# Patient Record
Sex: FEMALE | Race: BLACK OR AFRICAN AMERICAN | NOT HISPANIC OR LATINO | Employment: UNEMPLOYED | URBAN - METROPOLITAN AREA
[De-identification: names, ages, dates, MRNs, and addresses within clinical notes are randomized per-mention and may not be internally consistent; named-entity substitution may affect disease eponyms.]

---

## 2017-01-08 ENCOUNTER — HOSPITAL ENCOUNTER (EMERGENCY)
Facility: HOSPITAL | Age: 22
Discharge: HOME | End: 2017-01-08
Attending: EMERGENCY MEDICINE

## 2017-01-08 VITALS
DIASTOLIC BLOOD PRESSURE: 71 MMHG | RESPIRATION RATE: 16 BRPM | OXYGEN SATURATION: 100 % | HEART RATE: 73 BPM | TEMPERATURE: 97.7 F | SYSTOLIC BLOOD PRESSURE: 102 MMHG | WEIGHT: 170 LBS

## 2017-01-08 DIAGNOSIS — F15.929: ICD-10-CM

## 2017-01-08 DIAGNOSIS — R55 NEAR SYNCOPE: Primary | ICD-10-CM

## 2017-01-08 LAB
AMPHETAMINES SERPL QL SCN: NEGATIVE
ATRIAL RATE: 78 BPM
BARBITURATES UR QL: NEGATIVE
BASE EXCESS BLDA CALC-SCNC: 0 MMOL/L (ref -2–3)
BENZODIAZ UR QL: NEGATIVE
CA-I BLD-SCNC: 1.13 MMOL/L (ref 1.12–1.32)
COCAINE UR QL: NEGATIVE
GLUCOSE SERPL-MCNC: 90 MG/DL (ref 65–140)
HCG UR QL: NEGATIVE
HCO3 BLDA-SCNC: 25.7 MMOL/L (ref 24–30)
HCT VFR BLD CALC: 38 % (ref 34.8–46.1)
HGB BLDA-MCNC: 12.9 G/DL (ref 11.5–15.4)
METHADONE UR QL: NEGATIVE
OPIATES UR QL SCN: NEGATIVE
P AXIS: 32 DEGREES
PCO2 BLD: 27 MMOL/L (ref 21–32)
PCO2 BLD: 45.5 MM HG (ref 42–50)
PCP UR QL: NEGATIVE
PH BLD: 7.36 [PH] (ref 7.3–7.4)
PO2 BLD: 27 MM HG (ref 35–45)
POTASSIUM BLD-SCNC: 3.6 MMOL/L (ref 3.5–5.3)
PR INTERVAL: 168 MS
QRS AXIS: 70 DEGREES
QRSD INTERVAL: 90 MS
QT INTERVAL: 360 MS
QTC INTERVAL: 410 MS
SAO2 % BLD FROM PO2: 48 % (ref 95–98)
SODIUM BLD-SCNC: 139 MMOL/L (ref 136–145)
SPECIMEN SOURCE: ABNORMAL
T WAVE AXIS: 38 DEGREES
THC UR QL: POSITIVE
VENTRICULAR RATE: 78 BPM

## 2017-01-08 PROCEDURE — 93005 ELECTROCARDIOGRAM TRACING: CPT | Performed by: EMERGENCY MEDICINE

## 2017-01-08 PROCEDURE — 82947 ASSAY GLUCOSE BLOOD QUANT: CPT

## 2017-01-08 PROCEDURE — 84132 ASSAY OF SERUM POTASSIUM: CPT

## 2017-01-08 PROCEDURE — 85014 HEMATOCRIT: CPT

## 2017-01-08 PROCEDURE — 99284 EMERGENCY DEPT VISIT MOD MDM: CPT

## 2017-01-08 PROCEDURE — 80307 DRUG TEST PRSMV CHEM ANLYZR: CPT | Performed by: EMERGENCY MEDICINE

## 2017-01-08 PROCEDURE — 84295 ASSAY OF SERUM SODIUM: CPT

## 2017-01-08 PROCEDURE — 82330 ASSAY OF CALCIUM: CPT

## 2017-01-08 PROCEDURE — 82803 BLOOD GASES ANY COMBINATION: CPT

## 2017-01-08 PROCEDURE — 81025 URINE PREGNANCY TEST: CPT | Performed by: EMERGENCY MEDICINE

## 2017-04-04 ENCOUNTER — HOSPITAL ENCOUNTER (EMERGENCY)
Facility: HOSPITAL | Age: 22
Discharge: HOME | End: 2017-04-04
Attending: EMERGENCY MEDICINE | Admitting: EMERGENCY MEDICINE

## 2017-04-04 VITALS
TEMPERATURE: 98.3 F | WEIGHT: 180 LBS | DIASTOLIC BLOOD PRESSURE: 56 MMHG | SYSTOLIC BLOOD PRESSURE: 102 MMHG | HEART RATE: 63 BPM | HEIGHT: 63 IN | RESPIRATION RATE: 18 BRPM | BODY MASS INDEX: 31.89 KG/M2 | OXYGEN SATURATION: 100 %

## 2017-04-04 DIAGNOSIS — R10.9 ABDOMINAL CRAMPING: Primary | ICD-10-CM

## 2017-04-04 LAB
ALBUMIN SERPL BCP-MCNC: 3.6 G/DL (ref 3.5–5)
ALP SERPL-CCNC: 106 U/L (ref 46–384)
ALT SERPL W P-5'-P-CCNC: 28 U/L (ref 12–78)
ANION GAP SERPL CALCULATED.3IONS-SCNC: 7 MMOL/L (ref 4–13)
AST SERPL W P-5'-P-CCNC: 16 U/L (ref 5–45)
BACTERIA UR QL AUTO: ABNORMAL /HPF
BASOPHILS # BLD AUTO: 0.03 THOUSANDS/ΜL (ref 0–0.1)
BASOPHILS NFR BLD AUTO: 0 % (ref 0–1)
BILIRUB SERPL-MCNC: 0.16 MG/DL (ref 0.2–1)
BILIRUB UR QL STRIP: NEGATIVE
BILIRUB UR QL STRIP: NEGATIVE
BUN SERPL-MCNC: 10 MG/DL (ref 5–25)
CALCIUM SERPL-MCNC: 9.2 MG/DL (ref 8.3–10.1)
CHLORIDE SERPL-SCNC: 104 MMOL/L (ref 100–108)
CLARITY UR: CLEAR
CLARITY UR: CLEAR
CO2 SERPL-SCNC: 27 MMOL/L (ref 21–32)
COLOR UR: YELLOW
COLOR UR: YELLOW
COLOR, POC: NORMAL
CREAT SERPL-MCNC: 0.67 MG/DL (ref 0.6–1.3)
EOSINOPHIL # BLD AUTO: 0.1 THOUSAND/ΜL (ref 0–0.61)
EOSINOPHIL NFR BLD AUTO: 1 % (ref 0–6)
ERYTHROCYTE [DISTWIDTH] IN BLOOD BY AUTOMATED COUNT: 13 % (ref 11.6–15.1)
GFR SERPL CREATININE-BSD FRML MDRD: >60 ML/MIN/1.73SQ M
GLUCOSE SERPL-MCNC: 98 MG/DL (ref 65–140)
GLUCOSE UR STRIP-MCNC: NEGATIVE MG/DL
GLUCOSE UR STRIP-MCNC: NEGATIVE MG/DL
HCG UR QL: NORMAL
HCT VFR BLD AUTO: 41.8 % (ref 34.8–46.1)
HGB BLD-MCNC: 13.7 G/DL (ref 11.5–15.4)
HGB UR QL STRIP.AUTO: NEGATIVE
HGB UR QL STRIP.AUTO: NEGATIVE
HOLD SPECIMEN: NORMAL
HYALINE CASTS #/AREA URNS LPF: ABNORMAL /LPF
KETONES UR STRIP-MCNC: NEGATIVE MG/DL
KETONES UR STRIP-MCNC: NEGATIVE MG/DL
LEUKOCYTE ESTERASE UR QL STRIP: ABNORMAL
LEUKOCYTE ESTERASE UR QL STRIP: NEGATIVE
LIPASE SERPL-CCNC: 198 U/L (ref 73–393)
LYMPHOCYTES # BLD AUTO: 3.64 THOUSANDS/ΜL (ref 0.6–4.47)
LYMPHOCYTES NFR BLD AUTO: 38 % (ref 14–44)
MCH RBC QN AUTO: 26.8 PG (ref 26.8–34.3)
MCHC RBC AUTO-ENTMCNC: 32.8 G/DL (ref 31.4–37.4)
MCV RBC AUTO: 82 FL (ref 82–98)
MONOCYTES # BLD AUTO: 0.58 THOUSAND/ΜL (ref 0.17–1.22)
MONOCYTES NFR BLD AUTO: 6 % (ref 4–12)
NEUTROPHILS # BLD AUTO: 5.21 THOUSANDS/ΜL (ref 1.85–7.62)
NEUTS SEG NFR BLD AUTO: 55 % (ref 43–75)
NITRITE UR QL STRIP: NEGATIVE
NITRITE UR QL STRIP: NEGATIVE
NON-SQ EPI CELLS URNS QL MICRO: ABNORMAL /HPF
NRBC BLD AUTO-RTO: 0 /100 WBCS
PH UR STRIP.AUTO: 5 [PH] (ref 4.5–8)
PH UR STRIP.AUTO: 5.5 [PH] (ref 4.5–8)
PLATELET # BLD AUTO: 301 THOUSANDS/UL (ref 149–390)
PMV BLD AUTO: 9.6 FL (ref 8.9–12.7)
POTASSIUM SERPL-SCNC: 3.6 MMOL/L (ref 3.5–5.3)
PROT SERPL-MCNC: 8.1 G/DL (ref 6.4–8.2)
PROT UR STRIP-MCNC: NEGATIVE MG/DL
PROT UR STRIP-MCNC: NEGATIVE MG/DL
RBC # BLD AUTO: 5.12 MILLION/UL (ref 3.81–5.12)
RBC #/AREA URNS AUTO: ABNORMAL /HPF
SODIUM SERPL-SCNC: 138 MMOL/L (ref 136–145)
SP GR UR STRIP.AUTO: 1.01 (ref 1–1.03)
SP GR UR STRIP.AUTO: 1.01 (ref 1–1.03)
UROBILINOGEN UR QL STRIP.AUTO: 0.2 E.U./DL
UROBILINOGEN UR QL STRIP.AUTO: 0.2 E.U./DL
WBC # BLD AUTO: 9.59 THOUSAND/UL (ref 4.31–10.16)
WBC #/AREA URNS AUTO: ABNORMAL /HPF

## 2017-04-04 PROCEDURE — 81001 URINALYSIS AUTO W/SCOPE: CPT

## 2017-04-04 PROCEDURE — 87147 CULTURE TYPE IMMUNOLOGIC: CPT

## 2017-04-04 PROCEDURE — 87591 N.GONORRHOEAE DNA AMP PROB: CPT | Performed by: EMERGENCY MEDICINE

## 2017-04-04 PROCEDURE — 81025 URINE PREGNANCY TEST: CPT

## 2017-04-04 PROCEDURE — 80053 COMPREHEN METABOLIC PANEL: CPT

## 2017-04-04 PROCEDURE — 99284 EMERGENCY DEPT VISIT MOD MDM: CPT

## 2017-04-04 PROCEDURE — 83690 ASSAY OF LIPASE: CPT

## 2017-04-04 PROCEDURE — 36415 COLL VENOUS BLD VENIPUNCTURE: CPT

## 2017-04-04 PROCEDURE — 81003 URINALYSIS AUTO W/O SCOPE: CPT

## 2017-04-04 PROCEDURE — 81002 URINALYSIS NONAUTO W/O SCOPE: CPT | Performed by: EMERGENCY MEDICINE

## 2017-04-04 PROCEDURE — 87491 CHLMYD TRACH DNA AMP PROBE: CPT | Performed by: EMERGENCY MEDICINE

## 2017-04-04 PROCEDURE — 85025 COMPLETE CBC W/AUTO DIFF WBC: CPT

## 2017-04-04 PROCEDURE — 87086 URINE CULTURE/COLONY COUNT: CPT

## 2017-04-05 LAB
CHLAMYDIA DNA CVX QL NAA+PROBE: NORMAL
N GONORRHOEA DNA GENITAL QL NAA+PROBE: NORMAL

## 2017-04-06 LAB
BACTERIA UR CULT: NORMAL
BACTERIA UR CULT: NORMAL

## 2018-03-27 ENCOUNTER — HOSPITAL ENCOUNTER (EMERGENCY)
Facility: HOSPITAL | Age: 23
Discharge: HOME | End: 2018-03-27
Attending: EMERGENCY MEDICINE
Payer: COMMERCIAL

## 2018-03-27 VITALS
SYSTOLIC BLOOD PRESSURE: 132 MMHG | RESPIRATION RATE: 18 BRPM | OXYGEN SATURATION: 99 % | HEART RATE: 89 BPM | DIASTOLIC BLOOD PRESSURE: 72 MMHG | TEMPERATURE: 97.6 F

## 2018-03-27 DIAGNOSIS — J02.9 SORE THROAT: Primary | ICD-10-CM

## 2018-03-27 PROCEDURE — 99282 EMERGENCY DEPT VISIT SF MDM: CPT

## 2018-03-27 RX ORDER — AZITHROMYCIN 250 MG/1
250 TABLET, FILM COATED ORAL DAILY
Qty: 6 TABLET | Refills: 0 | Status: SHIPPED | OUTPATIENT
Start: 2018-03-27 | End: 2018-04-01

## 2018-03-27 RX ADMIN — DEXAMETHASONE SODIUM PHOSPHATE 10 MG: 10 INJECTION, SOLUTION INTRAMUSCULAR; INTRAVENOUS at 16:12

## 2018-03-27 NOTE — ED ATTENDING ATTESTATION
Beronica Katz MD, saw and evaluated the patient  I have discussed the patient with the resident/non-physician practitioner and agree with the resident's/non-physician practitioner's findings, Plan of Care, and MDM as documented in the resident's/non-physician practitioner's note, except where noted  All available labs and Radiology studies were reviewed  At this point I agree with the current assessment done in the Emergency Department  I have conducted an independent evaluation of this patient a history and physical is as follows:   The patient presents for evaluation of sore throat, the symptoms have been present for approximately 2 days right seems worse than the left she has no fever or chills no difficulty swallowing on exam she has a normal voice no stridor or drooling mild nasal congestion throat there is mild asymmetry right greater than left in the posterior pharyngeal area there is no exudate noted the uvula is midline tender anterior cervical node on the right  Neck supple lungs clear  Impression pharyngitis Decadron antibiotics careful return precautions I do not believe this to be a peritonsillar abscess from a clinical standpoint    Critical Care Time  CritCare Time    Procedures

## 2018-03-27 NOTE — DISCHARGE INSTRUCTIONS

## 2018-03-27 NOTE — ED PROVIDER NOTES
History  Chief Complaint   Patient presents with    Sore Throat     pt with sore throat feels like her right tonsil is swollen        History provided by:  Relative  History limited by:  Acuity of condition   used: No    Sore Throat   Associated symptoms: no adenopathy, no chest pain, no chills, no cough, no fever, no headaches, no neck stiffness, no rash and no shortness of breath      23year old female with recurrent strep throat p/w right throat pain  Onset 2 days ago  Hx similar episodes  Denies dysphonia, odynophagia  No other assoc sx  No fevers  No  Cough  No a/e factors, no assoc sx  A/P: sore throat, right tonsillomegaly with exudate  Uvula midline  Will tx with amoxil  None       Past Medical History:   Diagnosis Date    Asthma        History reviewed  No pertinent surgical history  History reviewed  No pertinent family history  I have reviewed and agree with the history as documented  Social History   Substance Use Topics    Smoking status: Current Some Day Smoker    Smokeless tobacco: Not on file    Alcohol use No        Review of Systems   Constitutional: Negative for chills, fatigue and fever  HENT: Positive for sore throat  Eyes: Negative for photophobia and visual disturbance  Respiratory: Negative for cough and shortness of breath  Cardiovascular: Negative for chest pain, palpitations and leg swelling  Gastrointestinal: Negative for diarrhea, nausea and vomiting  Endocrine: Negative for polydipsia and polyuria  Genitourinary: Negative for decreased urine volume, difficulty urinating, dysuria and frequency  Musculoskeletal: Negative for back pain, neck pain and neck stiffness  Skin: Negative for color change and rash  Allergic/Immunologic: Negative for environmental allergies and immunocompromised state  Neurological: Negative for dizziness and headaches  Hematological: Negative for adenopathy  Does not bruise/bleed easily  Psychiatric/Behavioral: Negative for dysphoric mood  The patient is not nervous/anxious  Physical Exam  ED Triage Vitals [03/27/18 1600]   Temperature Pulse Respirations Blood Pressure SpO2   97 6 °F (36 4 °C) 89 18 132/72 99 %      Temp Source Heart Rate Source Patient Position - Orthostatic VS BP Location FiO2 (%)   Oral Monitor Sitting Left arm --      Pain Score       5           Orthostatic Vital Signs  Vitals:    03/27/18 1600   BP: 132/72   Pulse: 89   Patient Position - Orthostatic VS: Sitting       Physical Exam   Constitutional: She is oriented to person, place, and time  She appears well-developed and well-nourished  No distress  HENT:   Head: Normocephalic and atraumatic  Nose: Nose normal    Eyes: Conjunctivae and EOM are normal  Pupils are equal, round, and reactive to light  No scleral icterus  Neck: Normal range of motion  Neck supple  No JVD present  No tracheal deviation present  No thyromegaly present  Cardiovascular: Normal rate, regular rhythm, normal heart sounds and intact distal pulses  Exam reveals no gallop and no friction rub  Pulmonary/Chest: Effort normal and breath sounds normal  No respiratory distress  She has no wheezes  She has no rales  She exhibits no tenderness  Abdominal: Soft  Bowel sounds are normal  She exhibits no distension and no mass  There is no tenderness  There is no rebound and no guarding  No hernia  Musculoskeletal: Normal range of motion  She exhibits no edema, tenderness or deformity  Neurological: She is alert and oriented to person, place, and time  She has normal reflexes  No cranial nerve deficit  Coordination normal    Skin: Skin is warm and dry  She is not diaphoretic  No erythema  Psychiatric: She has a normal mood and affect  Her behavior is normal    Nursing note and vitals reviewed        ED Medications  Medications   dexamethasone 10 mg/mL oral liquid 10 mg 1 mL (10 mg Oral Given 3/27/18 1612)       Diagnostic Studies  Results Reviewed     None                 No orders to display         Procedures  Procedures      Phone Consults  ED Phone Contact    ED Course  ED Course                                MDM  Number of Diagnoses or Management Options  Sore throat: new and requires workup    CritCare Time    Disposition  Final diagnoses:   Sore throat     Time reflects when diagnosis was documented in both MDM as applicable and the Disposition within this note     Time User Action Codes Description Comment    3/27/2018  4:15 PM Roby Patel Add [J02 9] Sore throat       ED Disposition     ED Disposition Condition Comment    Discharge  Roshni Garcia discharge to home/self care  Condition at discharge: Good        Follow-up Information     Follow up With Specialties Details Why [address] [facility] Emergency Department Emergency Medicine   [address] [place] [address], [address] [place] [address], [address] [place] [address] Jj Sanchez MD Otolaryngology   [address] [place] [address]  [phone]           Discharge Medication List as of 3/27/2018  4:22 PM      START taking these medications    Details   azithromycin (ZITHROMAX) 250 mg tablet Take 1 tablet (250 mg total) by mouth daily for 5 days Take first 2 tablets together, then 1 every day until finished , Starting Tue 3/27/2018, Until Sun 4/1/2018, Print           No discharge procedures on file  ED Provider  Attending physically available and evaluated Roshni Garcia  RAMONA managed the patient along with the ED Attending      Electronically Signed by         Marian Mckeon DO  03/30/18 4068

## 2018-08-27 ENCOUNTER — HOSPITAL ENCOUNTER (EMERGENCY)
Facility: HOSPITAL | Age: 23
Discharge: HOME | End: 2018-08-27
Attending: EMERGENCY MEDICINE | Admitting: EMERGENCY MEDICINE
Payer: COMMERCIAL

## 2018-08-27 VITALS
HEART RATE: 90 BPM | RESPIRATION RATE: 20 BRPM | SYSTOLIC BLOOD PRESSURE: 117 MMHG | DIASTOLIC BLOOD PRESSURE: 64 MMHG | BODY MASS INDEX: 35.96 KG/M2 | OXYGEN SATURATION: 99 % | TEMPERATURE: 98.4 F | WEIGHT: 203 LBS

## 2018-08-27 DIAGNOSIS — J02.9 VIRAL PHARYNGITIS: Primary | ICD-10-CM

## 2018-08-27 DIAGNOSIS — J06.9 VIRAL URI WITH COUGH: ICD-10-CM

## 2018-08-27 LAB — S PYO AG THROAT QL: NEGATIVE

## 2018-08-27 PROCEDURE — 99283 EMERGENCY DEPT VISIT LOW MDM: CPT

## 2018-08-27 PROCEDURE — 87430 STREP A AG IA: CPT | Performed by: PHYSICIAN ASSISTANT

## 2018-08-27 RX ORDER — IBUPROFEN 600 MG/1
600 TABLET ORAL EVERY 6 HOURS PRN
Qty: 30 TABLET | Refills: 0 | Status: SHIPPED | OUTPATIENT
Start: 2018-08-27 | End: 2022-07-08

## 2018-08-27 NOTE — ED PROVIDER NOTES
History  Chief Complaint   Patient presents with    Fever - 9 weeks to 74 years     fever yesterday, sore throat, mild ache, took tylenol yesterday  68-year-old female with past medical history of asthma, who presents to the emergency department for fever and sore throat for the past week  Patient states that she had a temperature of 101 3° F yesterday, but no fever today  She did take Tylenol yesterday with improvement in temperature  States that the 6/10 sore throat is worse with swallowing  Also complains of generalized headache, nasal congestion, productive cough, rhinorrhea  Denies drooling  Denies ear pain, neck pain  Denies chest pain or shortness of breath  Has positive ill contacts with relative who has similar symptoms  History provided by:  Patient   used: No    Fever - 9 weeks to 74 years   Associated symptoms: congestion, cough, headaches, rhinorrhea and sore throat    Associated symptoms: no chest pain, no chills, no diarrhea, no dysuria, no ear pain, no myalgias, no nausea, no rash and no vomiting        None       Past Medical History:   Diagnosis Date    Asthma        History reviewed  No pertinent surgical history  History reviewed  No pertinent family history  I have reviewed and agree with the history as documented  Social History   Substance Use Topics    Smoking status: Current Some Day Smoker    Smokeless tobacco: Never Used    Alcohol use No        Review of Systems   Constitutional: Positive for fever  Negative for chills  HENT: Positive for congestion, rhinorrhea and sore throat  Negative for drooling, ear pain, trouble swallowing and voice change  Respiratory: Positive for cough  Negative for chest tightness, shortness of breath and wheezing  Cardiovascular: Negative for chest pain, palpitations and leg swelling  Gastrointestinal: Negative for abdominal pain, constipation, diarrhea, nausea and vomiting     Genitourinary: Negative for dysuria, flank pain, frequency, hematuria and urgency  Musculoskeletal: Negative for arthralgias, back pain, gait problem, joint swelling, myalgias, neck pain and neck stiffness  Skin: Negative for color change, pallor, rash and wound  Neurological: Positive for headaches  Negative for dizziness, syncope, weakness, light-headedness and numbness  Physical Exam  Physical Exam   Constitutional: She is oriented to person, place, and time  She appears well-developed and well-nourished  No distress  HENT:   Head: Normocephalic and atraumatic  Posterior oropharynx mildly erythematous with tonsillar hypertrophy bilaterally  No overlying exudates  Uvula midline  Eyes: Conjunctivae and EOM are normal  Pupils are equal, round, and reactive to light  Neck: Normal range of motion  Neck supple  Cardiovascular: Normal rate, regular rhythm and intact distal pulses  Pulmonary/Chest: Effort normal and breath sounds normal  She has no wheezes  She has no rales  Abdominal: Soft  Bowel sounds are normal  She exhibits no distension  There is no tenderness  There is no rebound and no guarding  Musculoskeletal: Normal range of motion  She exhibits no edema or tenderness  Lymphadenopathy:     She has cervical adenopathy  Neurological: She is alert and oriented to person, place, and time  No cranial nerve deficit or sensory deficit  She exhibits normal muscle tone  Coordination normal    Skin: Skin is warm and dry  Capillary refill takes less than 2 seconds  She is not diaphoretic  Psychiatric: She has a normal mood and affect  Her behavior is normal    Nursing note and vitals reviewed        Vital Signs  ED Triage Vitals [08/27/18 1251]   Temperature Pulse Respirations Blood Pressure SpO2   98 4 °F (36 9 °C) 90 20 117/64 99 %      Temp src Heart Rate Source Patient Position - Orthostatic VS BP Location FiO2 (%)   -- -- -- -- --      Pain Score       6           Vitals:    08/27/18 1251   BP: 117/64   Pulse: 90       Visual Acuity      ED Medications  Medications - No data to display    Diagnostic Studies  Results Reviewed     Procedure Component Value Units Date/Time    Rapid Strep A Screen Only, Adults [[id number]]  (Normal) Collected:  08/27/18 1350    Lab Status:  Final result Specimen:  Throat from Throat Updated:  08/27/18 1413     Rapid Strep A Screen Negative                 No orders to display              Procedures  Procedures       Phone Contacts  ED Phone Contact    ED Course                               MDM  Number of Diagnoses or Management Options  Viral pharyngitis:   Viral URI with cough:   Diagnosis management comments: Differential Diagnosis includes but is not limited to:   Strep pharyngitis, viral pharyngitis, viral URI with cough, low suspicion for pneumonia as lungs are clear to auscultation  Strep is negative  Likely viral in nature  Patient instructed on supportive therapy  Discharge home with primary care follow-up  Amount and/or Complexity of Data Reviewed  Clinical lab tests: ordered and reviewed      CritCare Time    Disposition  Final diagnoses:   Viral URI with cough   Viral pharyngitis     Time reflects when diagnosis was documented in both MDM as applicable and the Disposition within this note     Time User Action Codes Description Comment    8/27/2018  2:29 PM Hi Gonzalez Add [J06 9,  B97 89] Viral URI with cough     8/27/2018  2:29 PM Tonia Owusu Add [J02 9] Viral pharyngitis     8/27/2018  2:29 PM Tonia Owusu Modify [J06 9,  B97 89] Viral URI with cough     8/27/2018  2:29 PM Hi Gonzalez Modify [J02 9] Viral pharyngitis       ED Disposition     ED Disposition Condition Comment    Discharge  Jef Dealandon discharge to home/self care      Condition at discharge: Good        Follow-up Information     Follow up With Specialties Details Why [facility] Family Medicine In 1 week  [address] Jerem[place] Melara [facility] [place] [address]  [phone]          Discharge Medication List as of 8/27/2018  2:30 PM      START taking these medications    Details   ibuprofen (MOTRIN) 600 mg tablet Take 1 tablet (600 mg total) by mouth every 6 (six) hours as needed for mild pain or moderate pain, Starting Mon 8/27/2018, Print      menthol-cetylpyridinium (CEPACOL) 3 MG lozenge Take 1 lozenge (3 mg total) by mouth as needed for sore throat, Starting Mon 8/27/2018, Print           No discharge procedures on file      ED Provider  Electronically Signed by           Joanie Guallpa PA-C  08/28/18 1149

## 2018-08-27 NOTE — DISCHARGE INSTRUCTIONS
Pharyngitis   WHAT YOU NEED TO KNOW:   Pharyngitis, or sore throat, is inflammation of the tissues and structures in your pharynx (throat)  Pharyngitis is most often caused by bacteria  It may also be caused by a cold or flu virus  Other causes include smoking, allergies, or acid reflux  DISCHARGE INSTRUCTIONS:   Call 911 for any of the following:   · You have trouble breathing or swallowing because your throat is swollen or sore  Return to the emergency department if:   · You are drooling because it hurts too much to swallow  · Your fever is higher than 102? F (39?C) or lasts longer than 3 days  · You are confused  · You taste blood in your throat  Contact your healthcare provider if:   · Your throat pain gets worse  · You have a painful lump in your throat that does not go away after 5 days  · Your symptoms do not improve after 5 days  · You have questions or concerns about your condition or care  Medicines:  Viral pharyngitis will go away on its own without treatment  Your sore throat should start to feel better in 3 to 5 days for both viral and bacterial infections  You may need any of the following:  · Antibiotics  treat a bacterial infection  · NSAIDs , such as ibuprofen, help decrease swelling, pain, and fever  NSAIDs can cause stomach bleeding or kidney problems in certain people  If you take blood thinner medicine, always ask your healthcare provider if NSAIDs are safe for you  Always read the medicine label and follow directions  · Acetaminophen  decreases pain and fever  It is available without a doctor's order  Ask how much to take and how often to take it  Follow directions  Acetaminophen can cause liver damage if not taken correctly  · Take your medicine as directed  Contact your healthcare provider if you think your medicine is not helping or if you have side effects  Tell him or her if you are allergic to any medicine   Keep a list of the medicines, vitamins, and herbs you take  Include the amounts, and when and why you take them  Bring the list or the pill bottles to follow-up visits  Carry your medicine list with you in case of an emergency  Manage your symptoms:   · Gargle salt water  Mix ¼ teaspoon salt in an 8 ounce glass of warm water and gargle  This may help decrease swelling in your throat  · Drink liquids as directed  You may need to drink more liquids than usual  Liquids may help soothe your throat and prevent dehydration  Ask how much liquid to drink each day and which liquids are best for you  · Use a cool-steam humidifier  to help moisten the air in your room and calm your cough  · Soothe your throat  with cough drops, ice, soft foods, or popsicles  Prevent the spread of pharyngitis:  Cover your mouth and nose when you cough or sneeze  Do not share food or drinks  Wash your hands often  Use soap and water  If soap and water are unavailable, use an alcohol based hand   Follow up with your healthcare provider as directed:  Write down your questions so you remember to ask them during your visits  © 2017 [facility] Information is for End User's use only and may not be sold, redistributed or otherwise used for commercial purposes  All illustrations and images included in CareNotes® are the copyrighted property of A D A M , Inc  or Lito Valdez  The above information is an  only  It is not intended as medical advice for individual conditions or treatments  Talk to your doctor, nurse or pharmacist before following any medical regimen to see if it is safe and effective for you  Upper Respiratory Infection   WHAT YOU NEED TO KNOW:   An upper respiratory infection is also called the common cold  It is an infection that can affect your nose, throat, ears, and sinuses  For healthy people, the common cold is usually not serious and does not need special treatment   Cold symptoms are usually worst for the first 3 to 5 days  Most people get better in 7 to 14 days  You may continue to cough for 2 to 3 weeks  Colds are caused by viruses and do not get better with antibiotics  DISCHARGE INSTRUCTIONS:   Seek care immediately if:   · You have chest pain or trouble breathing  Contact your healthcare provider if:   · You have a fever over 102ºF (39°C)  · Your sore throat gets worse or you see white or yellow spots in your throat  · Your symptoms get worse after 3 to 5 days or your cold is not better in 14 days  · You have a rash anywhere on your skin  · You have large, tender lumps in your neck  · You have thick, green, or yellow drainage from your nose  · You cough up thick yellow, green, or bloody mucus  · You are vomiting for more than 24 hours and cannot keep fluids down  · You have a bad earache  · You have questions or concerns about your condition or care  Medicines: You may need any of the following:  · Decongestants  help reduce nasal congestion and help you breathe more easily  If you take decongestant pills, they may make you feel restless or cause problems with your sleep  Do not use decongestant sprays for more than a few days  · Cough suppressants  help reduce coughing  Ask your healthcare provider which type of cough medicine is best for you  · NSAIDs , such as ibuprofen, help decrease swelling, pain, and fever  NSAIDs can cause stomach bleeding or kidney problems in certain people  If you take blood thinner medicine, always ask your healthcare provider if NSAIDs are safe for you  Always read the medicine label and follow directions  · Acetaminophen  decreases pain and fever  It is available without a doctor's order  Ask how much to take and how often to take it  Follow directions   Read the labels of all other medicines you are using to see if they also contain acetaminophen, or ask your doctor or pharmacist  Acetaminophen can cause liver damage if not taken correctly  Do not use more than 4 grams (4,000 milligrams) total of acetaminophen in one day  · Take your medicine as directed  Contact your healthcare provider if you think your medicine is not helping or if you have side effects  Tell him or her if you are allergic to any medicine  Keep a list of the medicines, vitamins, and herbs you take  Include the amounts, and when and why you take them  Bring the list or the pill bottles to follow-up visits  Carry your medicine list with you in case of an emergency  Follow up with your healthcare provider as directed:  Write down your questions so you remember to ask them during your visits  Self-care:   · Rest as much as possible  Slowly start to do more each day  · Drink more liquids as directed  Liquids will help thin and loosen mucus so you can cough it up  Liquids will also help prevent dehydration  Liquids that help prevent dehydration include water, fruit juice, and broth  Do not drink liquids that contain caffeine  Caffeine can increase your risk for dehydration  Ask your healthcare provider how much liquid to drink each day  · Soothe a sore throat  Gargle with warm salt water  This helps your sore throat feel better  Make salt water by dissolving ¼ teaspoon salt in 1 cup warm water  You may also suck on hard candy or throat lozenges  You may use a sore throat spray  · Use a humidifier or vaporizer  Use a cool mist humidifier or a vaporizer to increase air moisture in your home  This may make it easier for you to breathe and help decrease your cough  · Use saline nasal drops as directed  These help relieve congestion  · Apply petroleum-based jelly around the outside of your nostrils  This can decrease irritation from blowing your nose  · Do not smoke  Nicotine and other chemicals in cigarettes and cigars can make your symptoms worse  They can also cause infections such as bronchitis or pneumonia   Ask your healthcare provider for information if you currently smoke and need help to quit  E-cigarettes or smokeless tobacco still contain nicotine  Talk to your healthcare provider before you use these products  Prevent spreading your cold to others:   · Try to stay away from other people during the first 2 to 3 days of your cold when it is more easily spread  · Do not share food or drinks  · Do not share hand towels with household members  · Wash your hands often, especially after you blow your nose  Turn away from other people and cover your mouth and nose with a tissue when you sneeze or cough  © 2017 [facility] Information is for End User's use only and may not be sold, redistributed or otherwise used for commercial purposes  All illustrations and images included in CareNotes® are the copyrighted property of [employer]  or Lito Valdez  The above information is an  only  It is not intended as medical advice for individual conditions or treatments  Talk to your doctor, nurse or pharmacist before following any medical regimen to see if it is safe and effective for you

## 2019-07-09 ENCOUNTER — HOSPITAL ENCOUNTER (EMERGENCY)
Facility: HOSPITAL | Age: 24
Discharge: HOME | End: 2019-07-09
Attending: EMERGENCY MEDICINE
Payer: COMMERCIAL

## 2019-07-09 VITALS
WEIGHT: 190 LBS | BODY MASS INDEX: 33.66 KG/M2 | TEMPERATURE: 97.7 F | RESPIRATION RATE: 16 BRPM | DIASTOLIC BLOOD PRESSURE: 68 MMHG | HEART RATE: 75 BPM | OXYGEN SATURATION: 98 % | SYSTOLIC BLOOD PRESSURE: 104 MMHG

## 2019-07-09 DIAGNOSIS — G43.909 MIGRAINE: Primary | ICD-10-CM

## 2019-07-09 LAB
EXT PREG TEST URINE: NORMAL
EXT. CONTROL ED NAV: NORMAL

## 2019-07-09 PROCEDURE — 81025 URINE PREGNANCY TEST: CPT | Performed by: EMERGENCY MEDICINE

## 2019-07-09 PROCEDURE — 96374 THER/PROPH/DIAG INJ IV PUSH: CPT

## 2019-07-09 PROCEDURE — 96361 HYDRATE IV INFUSION ADD-ON: CPT

## 2019-07-09 PROCEDURE — 99283 EMERGENCY DEPT VISIT LOW MDM: CPT

## 2019-07-09 PROCEDURE — 96375 TX/PRO/DX INJ NEW DRUG ADDON: CPT

## 2019-07-09 PROCEDURE — 99283 EMERGENCY DEPT VISIT LOW MDM: CPT | Performed by: EMERGENCY MEDICINE

## 2019-07-09 RX ORDER — DIPHENHYDRAMINE HYDROCHLORIDE 50 MG/ML
25 INJECTION INTRAMUSCULAR; INTRAVENOUS ONCE
Status: COMPLETED | OUTPATIENT
Start: 2019-07-09 | End: 2019-07-09

## 2019-07-09 RX ORDER — KETOROLAC TROMETHAMINE 30 MG/ML
15 INJECTION, SOLUTION INTRAMUSCULAR; INTRAVENOUS ONCE
Status: COMPLETED | OUTPATIENT
Start: 2019-07-09 | End: 2019-07-09

## 2019-07-09 RX ORDER — METOCLOPRAMIDE HYDROCHLORIDE 5 MG/ML
10 INJECTION INTRAMUSCULAR; INTRAVENOUS ONCE
Status: COMPLETED | OUTPATIENT
Start: 2019-07-09 | End: 2019-07-09

## 2019-07-09 RX ADMIN — DIPHENHYDRAMINE HYDROCHLORIDE 25 MG: 50 INJECTION, SOLUTION INTRAMUSCULAR; INTRAVENOUS at 16:42

## 2019-07-09 RX ADMIN — METOCLOPRAMIDE 10 MG: 5 INJECTION, SOLUTION INTRAMUSCULAR; INTRAVENOUS at 16:41

## 2019-07-09 RX ADMIN — KETOROLAC TROMETHAMINE 15 MG: 30 INJECTION, SOLUTION INTRAMUSCULAR at 16:47

## 2019-07-09 RX ADMIN — SODIUM CHLORIDE 1000 ML: 0.9 INJECTION, SOLUTION INTRAVENOUS at 16:38

## 2019-07-09 NOTE — ED NOTES
Dr Jackie Hope at bedside     Rachid Mishra, [facility]  07/09/19 1103

## 2019-07-09 NOTE — ED PROVIDER NOTES
History  Chief Complaint   Patient presents with    Migraine     4th one this week  +headache with migraine HX in past  +light/sound sensitivity  +n/v      Patient is a 72-year-old female presents for a headache  Patient says that she has had 4 different headaches this week  She says that they have all been similar in nature  She says she has had headaches like this in the past but has not had them for a while  She says  The headache is located in the left side of her head and is constant in nature  It s associated with vomiting  She admits to minor photophobia  She denies any visual changes, neck pain, trauma to her head  She has no focal neurologic deficits on exam    She is in no acute distress in the room          Prior to Admission Medications   Prescriptions Last Dose Informant Patient Reported? Taking?   ibuprofen (MOTRIN) 600 mg tablet Not Taking at Unknown time  No No   Sig: Take 1 tablet (600 mg total) by mouth every 6 (six) hours as needed for mild pain or moderate pain   Patient not taking: Reported on 7/9/2019   menthol-cetylpyridinium (CEPACOL) 3 MG lozenge Not Taking at Unknown time  No No   Sig: Take 1 lozenge (3 mg total) by mouth as needed for sore throat   Patient not taking: Reported on 7/9/2019      Facility-Administered Medications: None       Past Medical History:   Diagnosis Date    Asthma     Migraine        History reviewed  No pertinent surgical history  History reviewed  No pertinent family history  I have reviewed and agree with the history as documented  Social History     Tobacco Use    Smoking status: Current Some Day Smoker     Types: Cigarettes    Smokeless tobacco: Never Used   Substance Use Topics    Alcohol use: No    Drug use: Yes     Types: Marijuana        Review of Systems   Constitutional: Negative for chills, diaphoresis and fever  HENT: Negative for congestion, sinus pressure, sore throat and trouble swallowing  Eyes: Positive for photophobia  Negative for pain, discharge, itching and visual disturbance  Respiratory: Negative for cough, chest tightness, shortness of breath and wheezing  Cardiovascular: Negative for chest pain, palpitations and leg swelling  Gastrointestinal: Positive for vomiting  Negative for abdominal distention, abdominal pain, blood in stool, diarrhea and nausea  Endocrine: Negative for polyphagia and polyuria  Genitourinary: Negative for difficulty urinating, dysuria, flank pain, hematuria, pelvic pain and vaginal bleeding  Musculoskeletal: Negative for arthralgias and back pain  Skin: Negative for color change and rash  Neurological: Positive for headaches  Negative for dizziness, syncope, weakness and light-headedness  Physical Exam  ED Triage Vitals [07/09/19 1544]   Temperature Pulse Respirations Blood Pressure SpO2   97 7 °F (36 5 °C) 72 18 142/82 99 %      Temp Source Heart Rate Source Patient Position - Orthostatic VS BP Location FiO2 (%)   Oral Monitor Sitting Left arm --      Pain Score       7             Orthostatic Vital Signs  Vitals:    07/09/19 1544 07/09/19 1640   BP: 142/82 104/68   Pulse: 72 75   Patient Position - Orthostatic VS: Sitting Lying       Physical Exam   Constitutional: She is oriented to person, place, and time  She appears well-developed and well-nourished  No distress  HENT:   Head: Normocephalic and atraumatic  Right Ear: External ear normal    Left Ear: External ear normal    Eyes: Pupils are equal, round, and reactive to light  Conjunctivae are normal    Neck: Normal range of motion  Neck supple  Cardiovascular: Normal rate, regular rhythm, normal heart sounds and intact distal pulses  Exam reveals no gallop and no friction rub  No murmur heard  Pulmonary/Chest: Effort normal and breath sounds normal  No respiratory distress  She has no wheezes  She has no rales  Abdominal: Soft  She exhibits no distension  There is no tenderness  There is no guarding  Musculoskeletal: Normal range of motion  She exhibits no edema, tenderness or deformity  5/5 muscle strength in all extremities   Lymphadenopathy:     She has no cervical adenopathy  Neurological: She is alert and oriented to person, place, and time  No cranial nerve deficit or sensory deficit  She exhibits normal muscle tone  Finger to nose normal, heel-to-shin normal   Skin: Skin is warm and dry  Psychiatric: She has a normal mood and affect  Nursing note and vitals reviewed  ED Medications  Medications   sodium chloride 0 9 % bolus 1,000 mL (1,000 mL Intravenous New Bag 7/9/19 1638)   ketorolac (TORADOL) injection 15 mg (15 mg Intravenous Given 7/9/19 1647)   metoclopramide (REGLAN) injection 10 mg (10 mg Intravenous Given 7/9/19 1641)   diphenhydrAMINE (BENADRYL) injection 25 mg (25 mg Intravenous Given 7/9/19 1642)       Diagnostic Studies  Results Reviewed     Procedure Component Value Units Date/Time    POCT pregnancy, urine [[id number]]  (Normal) Resulted:  07/09/19 1635    Lab Status:  Final result Updated:  07/09/19 1654     EXT PREG TEST UR (Ref: Negative) negative result     Control valid control                 No orders to display         Procedures  Procedures        ED Course                               MDM  Number of Diagnoses or Management Options  Migraine:   Diagnosis management comments:  61-year-old female presenting for migraine headache  Has had for similar headache is over the past week  Some headaches she has had the past   No focal neurologic deficits  No acute distress on exam   Vitals within normal limits  Patient will get a migraine cocktail  Will reassess  Patient feeling better after medications    Patient given follow-up information with headache specialist   Patient discharged home      Disposition  Final diagnoses:   Migraine     Time reflects when diagnosis was documented in both MDM as applicable and the Disposition within this note     Time User Action Codes Description Comment    7/9/2019  5:28 PM Nathalia Shepherd Add [J33 [id number]] Migraine       ED Disposition     ED Disposition Condition Date/Time Comment    Discharge Stable Tue Jul 9, 2019  5:28 PM Jan Barraza discharge to home/self care  Follow-up Information     Follow up With Specialties Details Why Contact Info    Remington Rea MD Neurology Schedule an appointment as soon as possible for a visit  For follow up of headaches [address]  [phone]            Patient's Medications   Discharge Prescriptions    No medications on file     No discharge procedures on file  ED Provider  Attending physically available and evaluated Jan Barraza I managed the patient along with the ED Attending      Electronically Signed by         David Thao DO  07/09/19 1017

## 2019-07-09 NOTE — ED ATTENDING ATTESTATION
Jennifer Sanchez MD, saw and evaluated the patient  I have discussed the patient with the resident/non-physician practitioner and agree with the resident's/non-physician practitioner's findings, Plan of Care, and MDM as documented in the resident's/non-physician practitioner's note, except where noted  All available labs and Radiology studies were reviewed  I was present for key portions of any procedure(s) performed by the resident/non-physician practitioner and I was immediately available to provide assistance  At this point I agree with the current assessment done in the Emergency Department  I have conducted an independent evaluation of this patient a history and physical is as follows:    28-year-old with history of migraines presents with headache  States same as typical migraines but has had a few of them this week  No recent history of trauma  No fever, chills, neck pain or stiffness  Not worst headache of life  Patient awake and alert, does not appear in any acute distress  Nonfocal neuro  Will treat symptomatically and give referral for headache clinic        Critical Care Time  Procedures

## 2020-02-03 ENCOUNTER — HOSPITAL ENCOUNTER (EMERGENCY)
Facility: HOSPITAL | Age: 25
Discharge: HOME | End: 2020-02-03
Attending: EMERGENCY MEDICINE
Payer: COMMERCIAL

## 2020-02-03 VITALS
DIASTOLIC BLOOD PRESSURE: 71 MMHG | WEIGHT: 209.44 LBS | HEART RATE: 72 BPM | SYSTOLIC BLOOD PRESSURE: 115 MMHG | RESPIRATION RATE: 18 BRPM | BODY MASS INDEX: 37.1 KG/M2 | TEMPERATURE: 97.5 F | OXYGEN SATURATION: 100 %

## 2020-02-03 DIAGNOSIS — J03.90 ACUTE TONSILLITIS: Primary | ICD-10-CM

## 2020-02-03 DIAGNOSIS — R59.0 ANTERIOR CERVICAL ADENOPATHY: ICD-10-CM

## 2020-02-03 LAB
EXT PREG TEST URINE: NEGATIVE
EXT. CONTROL ED NAV: NORMAL

## 2020-02-03 PROCEDURE — 99283 EMERGENCY DEPT VISIT LOW MDM: CPT

## 2020-02-03 PROCEDURE — 99283 EMERGENCY DEPT VISIT LOW MDM: CPT | Performed by: PHYSICIAN ASSISTANT

## 2020-02-03 PROCEDURE — 81025 URINE PREGNANCY TEST: CPT | Performed by: PHYSICIAN ASSISTANT

## 2020-02-03 RX ORDER — AMOXICILLIN 500 MG/1
500 CAPSULE ORAL 3 TIMES DAILY
Qty: 21 CAPSULE | Refills: 0 | Status: SHIPPED | OUTPATIENT
Start: 2020-02-03 | End: 2020-02-10

## 2020-02-03 NOTE — ED PROVIDER NOTES
History  Chief Complaint   Patient presents with    Sore Throat     Pt reports sore throat x 1 week  Pt reports pain when she swallows  Pt denies fevers      Patient presents to emergency department with a sore throat for a week  Patient was concerned with persistent symptoms and pain for evaluation  She is not having any fevers or chills no vomiting or diarrhea  She denies any cough  Patient admits her last menstrual cycle was the end of December and is unclear if she could be pregnant  She denies any abdominal pain or nausea or vomiting or leakage of fluids or cramping or vaginal bleeding  - hx irregular cycles  Prior to Admission Medications   Prescriptions Last Dose Informant Patient Reported? Taking?   ibuprofen (MOTRIN) 600 mg tablet   No No   Sig: Take 1 tablet (600 mg total) by mouth every 6 (six) hours as needed for mild pain or moderate pain   Patient not taking: Reported on 7/9/2019   menthol-cetylpyridinium (CEPACOL) 3 MG lozenge   No No   Sig: Take 1 lozenge (3 mg total) by mouth as needed for sore throat   Patient not taking: Reported on 7/9/2019      Facility-Administered Medications: None       Past Medical History:   Diagnosis Date    Asthma     Migraine        No past surgical history on file  No family history on file  I have reviewed and agree with the history as documented  Social History     Tobacco Use    Smoking status: Current Some Day Smoker     Types: Cigarettes    Smokeless tobacco: Never Used   Substance Use Topics    Alcohol use: No    Drug use: Yes     Types: Marijuana        Review of Systems   All other systems reviewed and are negative  Physical Exam  Physical Exam   Constitutional: She appears well-developed and well-nourished  HENT:   Head: Normocephalic and atraumatic     Right Ear: Tympanic membrane and external ear normal    Left Ear: Tympanic membrane and external ear normal    Erythema to pharynx no exudates   Eyes: Conjunctivae and EOM are normal    Neck: Neck supple  Anterior cervical adenopathy tender but soft and mobile   Cardiovascular: Normal rate, regular rhythm, normal heart sounds and intact distal pulses  Pulmonary/Chest: Effort normal and breath sounds normal    Abdominal: Soft  Bowel sounds are normal    Musculoskeletal: Normal range of motion  Lymphadenopathy:     She has cervical adenopathy  Neurological: She is alert  Skin: Skin is warm  No rash noted  Psychiatric: She has a normal mood and affect  Her behavior is normal    Nursing note and vitals reviewed  Vital Signs  ED Triage Vitals [02/03/20 1750]   Temperature Pulse Respirations Blood Pressure SpO2   97 5 °F (36 4 °C) 72 18 115/71 100 %      Temp src Heart Rate Source Patient Position - Orthostatic VS BP Location FiO2 (%)   -- -- -- -- --      Pain Score       --           Vitals:    02/03/20 1750   BP: 115/71   Pulse: 72         Visual Acuity      ED Medications  Medications - No data to display    Diagnostic Studies  Results Reviewed     Procedure Component Value Units Date/Time    POCT pregnancy, urine [[id number]]  (Normal) Resulted:  02/03/20 1921    Lab Status:  Final result Updated:  02/03/20 1921     EXT PREG TEST UR (Ref: Negative) negative     Control valid                 No orders to display              Procedures  Procedures         ED Course                               MDM  Number of Diagnoses or Management Options  Acute tonsillitis: new and does not require workup  Anterior cervical adenopathy: new and does not require workup  Diagnosis management comments: Patient's last menstrual cycle was the end of December will do pregnancy test   Patient's urine pregnancy test was negative discussed importance of follow-up for menstrual irregularity  Will start patient amoxicillin instructions reviewed      Patient Progress  Patient progress: stable        Disposition  Final diagnoses:   Acute tonsillitis   Anterior cervical adenopathy     Time reflects when diagnosis was documented in both MDM as applicable and the Disposition within this note     Time User Action Codes Description Comment    2/3/2020  6:59 PM Andressa Hunter [[id number]] Acute tonsillitis     2/3/2020  6:59 PM Andressa Hunter [R59 [id number]] Anterior cervical adenopathy       ED Disposition     ED Disposition Condition Date/Time Comment    Discharge Stable Mon Feb 3, 2020  6:59 PM Soheila Barrera discharge to home/self care  Follow-up Information     Follow up With Specialties Details Why Contact Info Additional [address] Internal Medicine   [address] [facility] [address]  [address] [address]  [address] [facility]   [address] [place] [address], [address] [address]  [place] [address], [address] [place], [place], [address] [phone]          Discharge Medication List as of 2/3/2020  7:21 PM      START taking these medications    Details   amoxicillin (AMOXIL) 500 mg capsule Take 1 capsule (500 mg total) by mouth 3 (three) times a day for 7 days, Starting Mon 2/3/2020, Until Mon 2/10/2020, Print         CONTINUE these medications which have NOT CHANGED    Details   ibuprofen (MOTRIN) 600 mg tablet Take 1 tablet (600 mg total) by mouth every 6 (six) hours as needed for mild pain or moderate pain, Starting Mon 8/27/2018, Print      menthol-cetylpyridinium (CEPACOL) 3 MG lozenge Take 1 lozenge (3 mg total) by mouth as needed for sore throat, Starting Mon 8/27/2018, Print           No discharge procedures on file      ED Provider  Electronically Signed by           Yvonne Evans PA-C  02/03/20 2032

## 2020-02-04 NOTE — DISCHARGE INSTRUCTIONS
Tylenol for fevers/pain  Saline spray for congestion you may use Mucinex for cough and congestion increase, fluids follow-up with the family doctor  Return to the emergency department for worsening symptoms  Amoxil as directed

## 2020-11-16 ENCOUNTER — HOSPITAL ENCOUNTER (EMERGENCY)
Facility: HOSPITAL | Age: 25
Discharge: HOME | End: 2020-11-16
Attending: EMERGENCY MEDICINE | Admitting: EMERGENCY MEDICINE
Payer: COMMERCIAL

## 2020-11-16 VITALS
SYSTOLIC BLOOD PRESSURE: 130 MMHG | HEART RATE: 87 BPM | OXYGEN SATURATION: 100 % | WEIGHT: 216.56 LBS | BODY MASS INDEX: 38.36 KG/M2 | RESPIRATION RATE: 16 BRPM | TEMPERATURE: 97.7 F | DIASTOLIC BLOOD PRESSURE: 74 MMHG

## 2020-11-16 DIAGNOSIS — N39.0 UTI (URINARY TRACT INFECTION): Primary | ICD-10-CM

## 2020-11-16 LAB
BACTERIA UR QL AUTO: ABNORMAL /HPF
BILIRUB UR QL STRIP: NEGATIVE
CLARITY UR: ABNORMAL
COLOR UR: ABNORMAL
EXT PREG TEST URINE: NEGATIVE
EXT. CONTROL ED NAV: NORMAL
GLUCOSE UR STRIP-MCNC: NEGATIVE MG/DL
HGB UR QL STRIP.AUTO: 150
KETONES UR STRIP-MCNC: ABNORMAL MG/DL
LEUKOCYTE ESTERASE UR QL STRIP: 500
MUCOUS THREADS UR QL AUTO: ABNORMAL
NITRITE UR QL STRIP: NEGATIVE
NON-SQ EPI CELLS URNS QL MICRO: ABNORMAL /HPF
PH UR STRIP.AUTO: 6 [PH]
PROT UR STRIP-MCNC: ABNORMAL MG/DL
RBC #/AREA URNS AUTO: ABNORMAL /HPF
SP GR UR STRIP.AUTO: 1.02 (ref 1–1.04)
UROBILINOGEN UA: 1 MG/DL
WBC #/AREA URNS AUTO: ABNORMAL /HPF

## 2020-11-16 PROCEDURE — 81003 URINALYSIS AUTO W/O SCOPE: CPT | Performed by: EMERGENCY MEDICINE

## 2020-11-16 PROCEDURE — 81001 URINALYSIS AUTO W/SCOPE: CPT | Performed by: EMERGENCY MEDICINE

## 2020-11-16 PROCEDURE — 87086 URINE CULTURE/COLONY COUNT: CPT | Performed by: EMERGENCY MEDICINE

## 2020-11-16 PROCEDURE — 99283 EMERGENCY DEPT VISIT LOW MDM: CPT

## 2020-11-16 PROCEDURE — 99284 EMERGENCY DEPT VISIT MOD MDM: CPT | Performed by: EMERGENCY MEDICINE

## 2020-11-16 PROCEDURE — 81025 URINE PREGNANCY TEST: CPT | Performed by: EMERGENCY MEDICINE

## 2020-11-16 PROCEDURE — 87077 CULTURE AEROBIC IDENTIFY: CPT | Performed by: EMERGENCY MEDICINE

## 2020-11-16 RX ORDER — CEPHALEXIN 500 MG/1
500 CAPSULE ORAL ONCE
Status: COMPLETED | OUTPATIENT
Start: 2020-11-16 | End: 2020-11-16

## 2020-11-16 RX ORDER — CEPHALEXIN 250 MG/1
500 CAPSULE ORAL 4 TIMES DAILY
Qty: 56 CAPSULE | Refills: 0 | Status: SHIPPED | OUTPATIENT
Start: 2020-11-16 | End: 2020-11-23

## 2020-11-16 RX ADMIN — CEPHALEXIN 500 MG: 500 CAPSULE ORAL at 18:27

## 2020-11-18 LAB — BACTERIA UR CULT: ABNORMAL

## 2022-02-07 LAB
EXTERNAL ABO GROUPING: NORMAL
EXTERNAL ABO GROUPING: NORMAL
EXTERNAL ANTIBODY SCREEN: NORMAL
EXTERNAL HIV SCREEN: NORMAL
EXTERNAL RH FACTOR: POSITIVE
EXTERNAL RH FACTOR: POSITIVE
HCV AB SER-ACNC: NEGATIVE

## 2022-03-02 ENCOUNTER — OFFICE VISIT (OUTPATIENT)
Dept: FAMILY MEDICINE CLINIC | Facility: CLINIC | Age: 27
End: 2022-03-02

## 2022-03-02 DIAGNOSIS — Z33.1 INCIDENTAL PREGNANCY: ICD-10-CM

## 2022-03-02 DIAGNOSIS — Z00.00 ANNUAL PHYSICAL EXAM: Primary | ICD-10-CM

## 2022-03-02 DIAGNOSIS — E66.01 CLASS 3 SEVERE OBESITY DUE TO EXCESS CALORIES WITHOUT SERIOUS COMORBIDITY WITH BODY MASS INDEX (BMI) OF 40.0 TO 44.9 IN ADULT (HCC): ICD-10-CM

## 2022-03-02 DIAGNOSIS — F32.A DEPRESSION, UNSPECIFIED DEPRESSION TYPE: ICD-10-CM

## 2022-03-02 DIAGNOSIS — Z13.31 DEPRESSION SCREEN: ICD-10-CM

## 2022-03-02 PROCEDURE — 99385 PREV VISIT NEW AGE 18-39: CPT | Performed by: PHYSICIAN ASSISTANT

## 2022-03-02 NOTE — LETTER
March 2, 2022     Patient: No Santana   YOB: 1995   Date of Visit: 3/2/2022       To Whom it May Concern:    Raj Naranjo is under my professional care  She was seen in my office on 3/2/2022  Due to medical reasons, patient cannot work for more than 5 hours a day  If you have any questions or concerns, please don't hesitate to call           Sincerely,          Kianna Child PA-C        CC: No Recipients

## 2022-03-02 NOTE — PATIENT INSTRUCTIONS
COVID-19 and Pregnancy   AMBULATORY CARE:   What you need to know about coronavirus disease 2019 (COVID-19) and pregnancy:  Pregnancy increases your risk for severe COVID-19 illness  COVID-19 can also lead to  delivery of your baby  Most babies who become infected with the new virus do not develop serious effects, but some do  It is important for you and your baby to stay safe during pregnancy and delivery  Signs and symptoms of COVID-19 in newborns: The following signs and symptoms may be from COVID-19, but they are also common in newborns  Your 's healthcare provider may recommend testing to confirm or rule out COVID-19  Your  may need a second test if the first is negative  · Fever    · Not moving arms or legs much, or being too sleepy to feed    · A runny nose or cough    · Fast breathing, or trouble breathing    · Vomiting, diarrhea, or not feeding well    Call your local emergency number (911 in the [address] [place] [address] Rd,[address]) if:   · You have trouble breathing or shortness of breath at rest     · You have chest pain or pressure that lasts longer than 5 minutes  · You become confused or hard to wake  · Your lips or face are blue  Seek care immediately if:   · You have a fever of 104°F (40°C) or higher  Call your doctor if:   · You have symptoms of COVID-19  · You have questions or concerns about your condition or care  How the 2019 coronavirus spreads: The virus spreads quickly and easily  The virus can be passed starting 2 to 3 days before symptoms begin or before a positive test if symptoms never begin  · Droplets are the main way all coronaviruses spread  The virus travels in droplets that form when a person talks, sings, coughs, or sneezes  The droplets can also float in the air for minutes or hours  Infection happens when you breathe in the droplets or get them in your eyes or nose  Close personal contact with an infected person increases your risk for infection   This means being within 6 feet (2 meters) of the person for at least 15 minutes over 24 hours  · Person-to-person contact can spread the virus  For example, a person with the virus on his or her hands can spread it by shaking hands with someone  · The virus can stay on objects and surfaces for up to 3 days  You may become infected by touching the object or surface and then touching your eyes or mouth  Protect yourself and your baby while you are pregnant: If you have COVID-19 during your pregnancy, healthcare providers will monitor you and your baby closely  Work with your healthcare provider or obstetrician  If you do not have either, experts recommend you contact a local [facility] or health department  The following measures can help keep you and your baby safe  Continue even after you are vaccinated against COVID-19  These are still the best ways to prevent infection:  · Wash your hands throughout the day  Use soap and water  Rub your soapy hands together, lacing your fingers  Wash the front and back of each hand, and in between your fingers  Use the fingers of one hand to scrub under the fingernails of the other hand  Wash for at least 20 seconds  Rinse with warm, running water for several seconds  Dry your hands with a clean towel or paper towel  Use hand  that contains alcohol if soap and water are not available  · Protect yourself from sneezes and coughs  Turn your face away and cover your mouth and nose if you are around someone who is sneezing or coughing  This helps protect you from the person's droplets  Cover your mouth and nose with a tissue when you need to sneeze or cough  Use the bend of your arm if you do not have a tissue  Throw the tissue away  Then wash your hands or use hand   · Try not to touch your face  If you get the virus on your hands, you can transfer it to your eyes, nose, or mouth and become infected   You can also transfer it to objects, surfaces, or people  · Follow worldwide, national, and local social distancing guidelines  Keep at least 6 feet (2 meters) between you and others  · Wear a face covering (mask) when needed  Use a disposable non-medical mask, or make a cloth covering with at least 2 layers  You can also create layers by putting a cloth covering over a disposable non-medical mask  Cover your mouth and your nose  Continue social distancing and washing your hands often  Do not put a face shield or covering on your   These increase the risk for sudden infant death syndrome (SIDS)  · Clean and disinfect high-touch surfaces and objects often  Use disinfecting wipes, or make a solution of 4 teaspoons of bleach in 1 quart (4 cups) of water  · Stay home if you are sick or think you may have COVID-19  It is important to stay home if you are waiting for a testing appointment or for test results  · Avoid or limit close physical contact with anyone who does not live in your home  Do not shake hands with, hug, or kiss a person as a greeting  If you must use public transportation (such as a bus or subway), try to sit or stand away from others  Wear your face covering  · Avoid in-person gatherings and crowds  Attend virtually if possible  What you can do to have a healthy pregnancy:   · Keep all prenatal and  appointments  You may be able to have certain prenatal appointments without having to go into the provider's office  Some providers offer phone, video, or other types of appointments  You may also be able to get prescriptions for a few months at a time  This will help lower the number of trips you need to make to the pharmacy for refills  If you do need to go into your provider's office, take precautions  Put a face covering on before you go into the office  Do not stand or sit within 6 feet (2 meters) of anyone in the waiting room, if possible   Do not stand or sit near anyone who is not wearing a face covering  · Get recommended vaccines  Your healthcare provider can tell you if you need vaccines not listed below, and when to get them  ? COVID-19 vaccines are given as a shot in 1 or 2 doses  Vaccination is recommended for everyone 5 years or older  One 2-dose vaccine is fully approved for those 12 or older  This vaccine also has an emergency use authorization (EUA) for children 11to 13years old  No vaccine is currently available for children younger than 5 years  A booster (additional) dose is given to help the immune system continue to protect against severe COVID-19  § A booster is recommended for all adults 18 or older  The booster can be a different brand of the COVID-19 vaccine than you originally received  The timing for the booster depends on the type of vaccine you received:    § 1-dose vaccine: The booster is given at least 2 months after you received the vaccine  § 2-dose vaccine: The booster is given at least 6 months after the second dose   § A booster can be given to adolescents 12to 16years old  Only 1 COVID-19 vaccine has an EUA for adolescent boosters  The booster is given at least 6 months after the second dose of the original vaccine series  · Get the influenza (flu) vaccine  Try to get the vaccine as soon as recommended each year, usually starting in September or October  · Get the Tdap vaccine  The Tdap vaccine protects you from tetanus, diphtheria, and pertussis  If possible, get the vaccine during weeks 27 to 36 of your pregnancy  You should get a dose of Tdap with each pregnancy  Take prenatal vitamins as directed  Your prenatal vitamins should contain folic acid  You need about 600 micrograms (mcg) of folic acid each day during pregnancy  Folic acid helps to form your baby's brain and spinal cord in early pregnancy  Eat a variety of healthy foods  Healthy foods are important, even if you take a prenatal vitamin   Healthy foods contain nutrients that help keep your immune system strong  Examples of healthy foods include vegetables, fruits, whole-grain breads and cereals, lean meats and poultry, fish, low-fat dairy products, and cooked beans  Do not have raw, undercooked, or unpasteurized food or drinks  Unpasteurized foods are foods that have not gone through the heating process (pasteurization) that destroys bacteria  Your healthcare provider or a dietitian can help you create healthy meal plans  Talk to your healthcare provider about exercise  Moderate exercise can help keep your immune system strong  Your healthcare provider can help you plan an exercise program that is safe for you during pregnancy  You may need to exercise at home if you cannot exercise outdoors, such as walking in a park  If you want to do pregnancy yoga or other group activities, be safe  Stay at least 6 feet (2 meters) away from others in the class, and the instructor  Wash your hands before you leave the building  Follow the facility's instructions for preventing infections  Try to lower your stress  You may be feeling more stressed than usual because of the COVID-19 outbreak  You may also feel stress from not being able to share your pregnancy with others  For example, you may not be able to have someone with you during prenatal visits or ultrasounds  Talk to your healthcare providers about ways to manage stress during this time  Pick 1 or 2 times a day to watch the news  Constant news watching about COVID-19 can increase your stress levels  Set a sleep schedule to go to bed and wake up at the same times each day  Do not smoke cigarettes, drink alcohol, or use drugs  Nicotine and other chemicals in cigarettes and cigars can harm your baby and your health  Alcohol can increase your risk for a miscarriage  Your baby may also be born too small or have other health problems  Certain drugs can be passed to your baby before he or she is born   Some can be passed through breast milk  It is best to quit cigarettes, alcohol, and drugs before you become pregnant and not start again after your baby is born  Ask your healthcare provider for information if you currently use any of these and need help to quit  Protect your  during delivery and while you are in the hospital:  It is not known for sure if an unborn baby can be infected with the virus that causes COVID-19  Some newborns have tested positive for the virus  The newborns may have been infected before, during, or after birth  The greatest risk is for a  to be in close contact with an infected person  Your baby may be tested for the virus soon after being born if you have COVID-23  He or she may be tested again before you leave the hospital  This depends on whether your baby has any signs or symptoms of COVID-19  You will be able to make choices for you and your baby during your hospital stay  Talk to healthcare providers about the following:  · Ask about temporary separation if you have COVID-19  Temporary separation means your  is moved to a different room from you  You will be able to make the decision if you want to do this  Separation will help lower your 's risk for being infected  You will still be able to give your  breast milk  You may need to pump the milk  Someone who does not have COVID-19 will then feed the pumped milk to your   You may instead choose to have your baby brought to you when you want to breastfeed  Wash your hands and the skin around your nipples before you hold your baby  Wear a face covering while you breastfeed  · Be careful if you have COVID-19 and do not choose temporary separation  Healthcare providers will keep your  at least 6 feet (2 meters) away from you as much as possible  Your  may be placed in an incubator  The incubator will help protect your  from infection   Always wash your hands and put on a face covering when you hold, touch, or have close contact with your   · Ask about visitors  The facility may not be allowing any visitors to newborns during this time  If you are allowed visitors, you may need to limit how many you can have at a time  Do not allow anyone who has known or suspected COVID-19 to visit  Even without signs or symptoms, the person can infect your  or others in the room  All visitors need to wash their hands and put on clean face coverings before entering your room  The face covering needs to stay on during the whole visit  Do not let anyone take the face covering down to make faces at your baby, talk, sneeze, or cough  Do not let anyone kiss you or your baby  Protect your  at home:   · You can choose to continue temporary separation if you have COVID-19  You can do this if an adult who does not have COVID-19 can care for your   Your healthcare provider can give you instructions on how to do this safely at home  Only have close contact with your  when needed  Remember to wash your hands and put on a clean face covering first  You may need to continue pumping your breast milk  A healthy adult can feed the pumped breast milk to your   You may instead choose to have your baby brought to you when you want to breastfeed  Take precautions to keep your baby safe  Wash your hands and the skin around your nipples before you hold your baby  You will also need to wear a face covering while you breastfeed  · Use face coverings safely  Everyone who has COVID-19 needs to wear a clean face covering while being within 6 feet (2 meters) of your   This includes other children in your home who are 2 years or older  Do not put a face covering or plastic face shield on your   Any covering increases your 's risk for sudden infant death syndrome (SIDS)   Do not use coverings on children younger than 2 years or on anyone who has breathing problems or cannot remove it  · Be careful about visitors  Continue precautions you used in the hospital  Do not allow anyone who has known or suspected COVID-19 to come over to see your   Have visitors put on clean face coverings before they enter your home  Have them wash their hands as soon as they come in  The face covering needs to stay on during the whole visit  · Keep all checkup appointments  You may be able to have some appointments by phone or video meeting  Other appointments will need to be in person, such as for vaccines  Vaccines are normally given to babies at certain ages  Until COVID-19 is under control, your 's provider will give you a vaccine schedule  It is important for your  to get all recommended vaccines  What you need to know about breastfeeding:  Breastfeeding for the first 6 months decreases your baby's risk for respiratory (lung) infections, allergies, asthma, and stomach problems  Breast milk also helps your baby develop a strong immune system  Breast milk is considered safe, even if you have COVID-19  Experts currently believe the virus that causes COVID-19 does not spread in breast milk  Do the following to help protect your baby:  · Wash your hands before every breastfeeding or pumping session  Even if you do not have COVID-19, you can transfer the virus from your hands to your baby or the pump  Use soap and water to wash your hands whenever possible  Use hand  that contains alcohol if soap and water are not available  · Clean and sanitize your breast pump after each use  Follow the 's directions for cleaning and sanitizing the pump  It is important not to use it until it is clean and sanitized  · If you have COVID-19:      ? Wear a face covering while you breastfeed or pump  This will help prevent you from passing the virus through droplets when you talk, cough, sneeze, or sing   The virus can stay on surfaces such as a breast pump for hours to days  ? Have someone who is not infected bottle feed your baby, if possible  Have the person wash his or her hands with soap and water before each feeding  The person can feed your  pumped breast milk or formula  Follow up with your doctor or obstetrician as directed:  Write down your questions so you remember to ask them during your visits  For more information:   · Centers for Disease Control and Prevention  [address] , [address]  Phone: 2- 968 - 334-5726  Web Address: [employer] com br    © Copyright [employer]  Information is for End User's use only and may not be sold, redistributed or otherwise used for commercial purposes  All illustrations and images included in CareNotes® are the copyrighted property of [employer]  or [employer]   The above information is an  only  It is not intended as medical advice for individual conditions or treatments  Talk to your doctor, nurse or pharmacist before following any medical regimen to see if it is safe and effective for you  Preeclampsia During Pregnancy   WHAT YOU NEED TO KNOW:   Preeclampsia is high blood pressure (BP) that usually develops after week 20 of pregnancy  It can also develop days or weeks after delivery  Your blood pressure may be 140/90 or higher  One or both numbers may be high  You may also have protein in your urine or damage to organs such as your kidneys or liver  Chronic hypertension with superimposed preeclampsia is preeclampsia in a woman with a history of hypertension before pregnancy  It can also be preeclampsia that develops before week 20 of pregnancy  Preeclampsia can lead to life-threatening conditions such as a stroke, eclampsia (seizures), or HELLP syndrome (blood cell destruction)  It is important to get screened for high BP during pregnancy  High BP does not always cause symptoms   Symptoms that do develop may be general, such as headaches and swelling that you may think are not serious  DISCHARGE INSTRUCTIONS:   Call your local emergency number (911 in the [address]) if:   · You have a seizure  · You have chest pain  Return to the emergency department if:   · You have severe abdominal pain with or without nausea and vomiting  · You develop a severe headache that does not go away with medicine  · You have blurred or spotted vision that does not go away  · You are bleeding from your vagina  Call your doctor or obstetrician if:   · You have new or increased swelling in your face or hands  · You are urinating little or not at all  · You do not feel your baby's movements as often as usual     · You have questions or concerns about your condition or care  Medicines: You may need any of the following:  · Blood pressure medicine  helps lower your blood pressure and protects your heart, lungs, brain, and kidneys  Take your blood pressure medicine exactly as directed  · Steroid medicine  helps your baby's lungs develop  These may be given if you have to deliver before 37 weeks of pregnancy  · Low doses of aspirin  may be recommended after 12 weeks of pregnancy if you are at high risk for preeclampsia  Aspirin may help prevent preeclampsia or problems that can happen from preeclampsia  Do not take aspirin unless directed by your healthcare provider  · Take your medicine as directed  Contact your healthcare provider if you think your medicine is not helping or if you have side effects  Tell him of her if you are allergic to any medicine  Keep a list of the medicines, vitamins, and herbs you take  Include the amounts, and when and why you take them  Bring the list or the pill bottles to follow-up visits  Carry your medicine list with you in case of an emergency  Manage preeclampsia:  Your BP will need to be checked by healthcare providers 1 to 2 times each week until your baby is born   The following are ways you can help manage high BP during pregnancy:  · Rest as directed  Your healthcare provider may tell you to rest more often if you have mild symptoms of preeclampsia  You may need to be in the hospital if your condition worsens  · Do not drink alcohol or smoke  Alcohol, nicotine, and other chemicals in cigarettes and cigars, can increase your BP  They can also harm your baby  Ask your healthcare provider for information if you currently drink alcohol or smoke and need help to quit  E-cigarettes or smokeless tobacco still contain nicotine  Talk to your healthcare provider before you use these products  · Do kick counts as directed  You may need to keep track of how often your baby moves or kicks over a certain amount of time  Ask your obstetrician how to do kick counts and how often to do them  · Check your weight each day  Weigh yourself every day before breakfast  Weight gain can be a sign of extra fluid in your body  Call your obstetrician if you have gained 2 or more pounds in a week  Follow up with your obstetrician as directed: You will need tests 1 to 2 times a week to check your condition  Tests include blood pressure checks, urine and blood tests, and fetal monitoring  Write down your questions so you remember to ask them during your visits  © [employer] 2022 Information is for End User's use only and may not be sold, redistributed or otherwise used for commercial purposes  All illustrations and images included in CareNotes® are the copyrighted property of [employer]  or [employer] Barnhart   The above information is an  only  It is not intended as medical advice for individual conditions or treatments  Talk to your doctor, nurse or pharmacist before following any medical regimen to see if it is safe and effective for you  Wellness Visit for Adults   AMBULATORY CARE:   A wellness visit  is when you see your healthcare provider to get screened for health problems   Your healthcare provider will also give you advice on how to stay healthy  Write down your questions so you remember to ask them  Ask your healthcare provider how often you should have a wellness visit  What happens at a wellness visit:  Your healthcare provider will ask about your health, and your family history of health problems  This includes high blood pressure, heart disease, and cancer  He or she will ask if you have symptoms that concern you, if you smoke, and about your mood  You may also be asked about your intake of medicines, supplements, food, and alcohol  Any of the following may be done:  · Your weight  will be checked  Your height may also be checked so your body mass index (BMI) can be calculated  Your BMI shows if you are at a healthy weight  · Your blood pressure  and heart rate will be checked  Your temperature may also be checked  · Blood and urine tests  may be done  Blood tests may be done to check your cholesterol levels  Abnormal cholesterol levels increase your risk for heart disease and stroke  You may also need a blood or urine test to check for diabetes if you are at increased risk  Urine tests may be done to look for signs of an infection or kidney disease  · A physical exam  includes checking your heartbeat and lungs with a stethoscope  Your healthcare provider may also check your skin to look for sun damage  · Screening tests  may be recommended  A screening test is done to check for diseases that may not cause symptoms  The screening tests you may need depend on your age, gender, family history, and lifestyle habits  For example, colorectal screening may be recommended if you are 48years old or older  Screening tests you need if you are a woman:   · A Pap smear  is used to screen for cervical cancer  Pap smears are usually done every 3 to 5 years depending on your age   You may need them more often if you have had abnormal Pap smear test results in the past  Ask your healthcare provider how often you should have a Pap smear  · A mammogram  is an x-ray of your breasts to screen for breast cancer  Experts recommend mammograms every 2 years starting at age 48 years  You may need a mammogram at age 52 years or younger if you have an increased risk for breast cancer  Talk to your healthcare provider about when you should start having mammograms and how often you need them  Vaccines you may need:   · Get an influenza vaccine  every year  The influenza vaccine protects you from the flu  Several types of viruses cause the flu  The viruses change over time, so new vaccines are made each year  · Get a tetanus-diphtheria (Td) booster vaccine  every 10 years  This vaccine protects you against tetanus and diphtheria  Tetanus is a severe infection that may cause painful muscle spasms and lockjaw  Diphtheria is a severe bacterial infection that causes a thick covering in the back of your mouth and throat  · Get a human papillomavirus (HPV) vaccine  if you are female and aged 23 to 32 or male 23 to 24 and never received it  This vaccine protects you from HPV infection  HPV is the most common infection spread by sexual contact  HPV may also cause vaginal, penile, and anal cancers  · Get a pneumococcal vaccine  if you are aged 72 years or older  The pneumococcal vaccine is an injection given to protect you from pneumococcal disease  Pneumococcal disease is an infection caused by pneumococcal bacteria  The infection may cause pneumonia, meningitis, or an ear infection  · Get a shingles vaccine  if you are 60 or older, even if you have had shingles before  The shingles vaccine is an injection to protect you from the varicella-zoster virus  This is the same virus that causes chickenpox  Shingles is a painful rash that develops in people who had chickenpox or have been exposed to the virus  How to eat healthy:  My Plate is a model for planning healthy meals   It shows the types and amounts of foods that should go on your plate  Fruits and vegetables make up about half of your plate, and grains and protein make up the other half  A serving of dairy is included on the side of your plate  The amount of calories and serving sizes you need depends on your age, gender, weight, and height  Examples of healthy foods are listed below:  · Eat a variety of vegetables  such as dark green, red, and orange vegetables  You can also include canned vegetables low in sodium (salt) and frozen vegetables without added butter or sauces  · Eat a variety of fresh fruits , canned fruit in 100% juice, frozen fruit, and dried fruit  · Include whole grains  At least half of the grains you eat should be whole grains  Examples include whole-wheat bread, wheat pasta, brown rice, and whole-grain cereals such as oatmeal     · Eat a variety of protein foods such as seafood (fish and shellfish), lean meat, and poultry without skin (turkey and chicken)  Examples of lean meats include pork leg, shoulder, or tenderloin, and beef round, sirloin, tenderloin, and extra lean ground beef  Other protein foods include eggs and egg substitutes, beans, peas, soy products, nuts, and seeds  · Choose low-fat dairy products such as skim or 1% milk or low-fat yogurt, cheese, and cottage cheese  · Limit unhealthy fats  such as butter, hard margarine, and shortening  Exercise:  Exercise at least 30 minutes per day on most days of the week  Some examples of exercise include walking, biking, dancing, and swimming  You can also fit in more physical activity by taking the stairs instead of the elevator or parking farther away from stores  Include muscle strengthening activities 2 days each week  Regular exercise provides many health benefits  It helps you manage your weight, and decreases your risk for type 2 diabetes, heart disease, stroke, and high blood pressure  Exercise can also help improve your mood   Ask your healthcare provider about the best exercise plan for you  General health and safety guidelines:   · Do not smoke  Nicotine and other chemicals in cigarettes and cigars can cause lung damage  Ask your healthcare provider for information if you currently smoke and need help to quit  E-cigarettes or smokeless tobacco still contain nicotine  Talk to your healthcare provider before you use these products  · Limit alcohol  A drink of alcohol is 12 ounces of beer, 5 ounces of wine, or 1½ ounces of liquor  · Lose weight, if needed  Being overweight increases your risk of certain health conditions  These include heart disease, high blood pressure, type 2 diabetes, and certain types of cancer  · Protect your skin  Do not sunbathe or use tanning beds  Use sunscreen with a SPF 15 or higher  Apply sunscreen at least 15 minutes before you go outside  Reapply sunscreen every 2 hours  Wear protective clothing, hats, and sunglasses when you are outside  · Drive safely  Always wear your seatbelt  Make sure everyone in your car wears a seatbelt  A seatbelt can save your life if you are in an accident  Do not use your cell phone when you are driving  This could distract you and cause an accident  Pull over if you need to make a call or send a text message  · Practice safe sex  Use latex condoms if are sexually active and have more than one partner  Your healthcare provider may recommend screening tests for sexually transmitted infections (STIs)  · Wear helmets, lifejackets, and protective gear  Always wear a helmet when you ride a bike or motorcycle, go skiing, or play sports that could cause a head injury  Wear protective equipment when you play sports  Wear a lifejacket when you are on a boat or doing water sports  © Copyright [employer] 2022 Information is for End User's use only and may not be sold, redistributed or otherwise used for commercial purposes   All illustrations and images included in CareNotes® are the copyrighted property of [employer]  or Le Barnhart   The above information is an  only  It is not intended as medical advice for individual conditions or treatments  Talk to your doctor, nurse or pharmacist before following any medical regimen to see if it is safe and effective for you

## 2022-03-02 NOTE — PROGRESS NOTES
[address] [facility] BRIAN    NAME: Dov Gilliam  AGE: 27 y o  SEX: female  : 1995     DATE: 3/2/2022     Assessment and Plan:     Problem List Items Addressed This Visit        Other    Incidental pregnancy     - Continue prenatal care with OBGYN  Depression     - History of depression  Currently coping with it on her own  Patient is afraid she will develop postpartum depression   - Offered patient referral to mental therapy, but she declines at this time  Advised patient to contact the office if she changes her mind  - Advised to continue with coping methods  Other Visit Diagnoses     Annual physical exam    -  Primary    Depression screen        Class 3 severe obesity due to excess calories without serious comorbidity with body mass index (BMI) of 40 0 to 44 9 in adult [facility])              Immunizations and preventive care screenings were discussed with patient today  Appropriate education was printed on patient's after visit summary  Counseling:  Alcohol/drug use: discussed moderation in alcohol intake, the recommendations for healthy alcohol use, and avoidance of illicit drug use  Dental Health: discussed importance of regular tooth brushing, flossing, and dental visits  Injury prevention: discussed safety/seat belts, safety helmets, smoke detectors, carbon dioxide detectors, and smoking near bedding or upholstery  Sexual health: discussed sexually transmitted diseases, partner selection, use of condoms, avoidance of unintended pregnancy, and contraceptive alternatives  · Exercise: the importance of regular exercise/physical activity was discussed  Recommend exercise 3-5 times per week for at least 30 minutes  Depression Screening and Follow-up Plan: Patient was screened for depression during today's encounter  They screened negative with a PHQ-2 score of 2          Return in about 1 year (around 3/2/2023) for Annual physical      Chief Complaint:     Chief Complaint   Patient presents with   [address] [facility] Patient Visit     np here today for a check up,      History of Present Illness:     Adult Annual Physical   Patient here for a comprehensive physical exam   Patient notes she has history of asthma and does use albuterol inhaler as needed  Patient notes she does not have to use it that often  Patient notes she has history of anxiety and depression  Patient notes she was going to a therapist and was prescribed medications in the past, but the medications either caused drowsiness or caused her to "zone out"  Patient notes she does feel comfortable talking with her best friend and her boyfriend when she feels anxious  Patient notes her little brother's girlfriend had postpartum depression and she is afraid that she will develop that as well  Patient notes she likes to listen to music, sleep, and watch anime shows to help cope  Patient denies any suicidal or homicidal ideations  - Patient notes her first trimester was awful in regards to nausea and vomiting  Patient notes currently the symptoms have slightly improved, but are still there  Patient notes she is able to work about 5 hours at her job before the nausea vomiting really kicks in  Diet and Physical Activity  · Diet/Nutrition: average  · Exercise: no formal exercise  Depression Screening  PHQ-2/9 Depression Screening    Little interest or pleasure in doing things: 0 - not at all  Feeling down, depressed, or hopeless: 2 - more than half the days  PHQ-2 Score: 2  PHQ-2 Interpretation: Negative depression screen       General Health  · Sleep: gets 4-6 hours of sleep on average  Works overnight so sometimes has trouble sleeping  · Hearing: normal - bilateral   · Vision: goes for regular eye exams and wears glasses  · Dental: regular dental visits  /GYN Health  · Last menstrual period: Unsure   KATHRINE is 7/22/2022  · History of STDs?: no      Review of Systems:     Review of Systems   Constitutional: Negative for appetite change, fatigue and fever  HENT: Negative for congestion, ear pain, rhinorrhea and sore throat  Eyes: Negative for pain and visual disturbance  Respiratory: Negative for cough, chest tightness and shortness of breath  Cardiovascular: Negative for chest pain, palpitations and leg swelling  Gastrointestinal: Positive for nausea  Negative for abdominal pain, constipation, diarrhea and vomiting  Genitourinary: Negative for difficulty urinating and dysuria  Musculoskeletal: Negative for arthralgias  Skin: Negative for rash  Neurological: Negative for dizziness and headaches  Psychiatric/Behavioral: The patient is not nervous/anxious  Past Medical History:     Past Medical History:   Diagnosis Date    Asthma     Migraine       Past Surgical History:     History reviewed  No pertinent surgical history     Social History:     Social History     Socioeconomic History    Marital status: Single     Spouse name: None    Number of children: None    Years of education: None    Highest education level: None   Occupational History    None   Tobacco Use    Smoking status: Current Some Day Smoker     Types: Cigarettes    Smokeless tobacco: Never Used   Vaping Use    Vaping Use: Never used   Substance and Sexual Activity    Alcohol use: No    Drug use: Yes     Types: Marijuana    Sexual activity: None   Other Topics Concern    None   Social History Narrative    None     Social Determinants of Health     Financial Resource Strain: Not on file   Food Insecurity: Not on file   Transportation Needs: Not on file   Physical Activity: Not on file   Stress: Not on file   Social Connections: Not on file   Intimate Partner Violence: Not on file   Housing Stability: Not on file      Family History:     Family History   Problem Relation Age of Onset    Asthma Father     Diabetes Father       Current Medications:     Current Outpatient Medications   Medication Sig Dispense Refill    ibuprofen (MOTRIN) 600 mg tablet Take 1 tablet (600 mg total) by mouth every 6 (six) hours as needed for mild pain or moderate pain (Patient not taking: Reported on 7/9/2019) 30 tablet 0    menthol-cetylpyridinium (CEPACOL) 3 MG lozenge Take 1 lozenge (3 mg total) by mouth as needed for sore throat (Patient not taking: Reported on 7/9/2019) 9 tablet 0     No current facility-administered medications for this visit  Allergies:     No Known Allergies   Physical Exam:     /60 (BP Location: Right arm, Patient Position: Sitting, Cuff Size: Adult)   Pulse 95   Temp (!) 97 4 °F (36 3 °C) (Temporal)   Resp 19   Ht 5' 3" (1 6 m)   Wt 104 kg (229 lb)   LMP  (LMP Unknown)   SpO2 98%   BMI 40 57 kg/m²     Physical Exam  Vitals and nursing note reviewed  Constitutional:       General: She is not in acute distress  Appearance: She is well-developed  HENT:      Head: Normocephalic and atraumatic  Right Ear: External ear normal       Left Ear: External ear normal       Nose: Nose normal    Eyes:      Conjunctiva/sclera: Conjunctivae normal    Cardiovascular:      Rate and Rhythm: Normal rate and regular rhythm  Pulses: Normal pulses  Heart sounds: Normal heart sounds  Pulmonary:      Effort: Pulmonary effort is normal  No respiratory distress  Breath sounds: Normal breath sounds  No wheezing  Abdominal:      General: Bowel sounds are normal       Palpations: Abdomen is soft  Tenderness: There is no abdominal tenderness  Comments: Gravid abdomen  Musculoskeletal:         General: Normal range of motion  Cervical back: Normal range of motion and neck supple  Skin:     General: Skin is warm and dry  Neurological:      Mental Status: She is alert and oriented to person, place, and time     Psychiatric:         Behavior: Behavior normal          VIOLETTA Atkins [address] [facility]

## 2022-03-05 VITALS
DIASTOLIC BLOOD PRESSURE: 60 MMHG | SYSTOLIC BLOOD PRESSURE: 110 MMHG | OXYGEN SATURATION: 98 % | BODY MASS INDEX: 40.57 KG/M2 | WEIGHT: 229 LBS | HEART RATE: 95 BPM | TEMPERATURE: 97.4 F | HEIGHT: 63 IN | RESPIRATION RATE: 19 BRPM

## 2022-03-05 PROBLEM — F32.A DEPRESSION: Status: ACTIVE | Noted: 2022-03-05

## 2022-03-05 PROBLEM — Z33.1 INCIDENTAL PREGNANCY: Status: ACTIVE | Noted: 2022-03-05

## 2022-03-06 NOTE — ASSESSMENT & PLAN NOTE
- History of depression  Currently coping with it on her own  Patient is afraid she will develop postpartum depression   - Offered patient referral to mental therapy, but she declines at this time  Advised patient to contact the office if she changes her mind  - Advised to continue with coping methods

## 2022-03-07 ENCOUNTER — TELEPHONE (OUTPATIENT)
Dept: ADMINISTRATIVE | Facility: OTHER | Age: 27
End: 2022-03-07

## 2022-03-07 NOTE — TELEPHONE ENCOUNTER
----- Message from Emily Mancuso PA-C sent at 3/5/2022 10:26 PM EST -----  Regarding: Hep C Screening  03/05/22 10:26 PM    Hello, our patient Corey Wang has had Hepatitis C completed/performed  Please assist in updating the patient chart by pulling the Care Everywhere (CE) document  The date of service is 2/7/2022       Thank you,  Kianna Child PA-C   [facility]

## 2022-03-07 NOTE — TELEPHONE ENCOUNTER
----- Message from Ashely Ayala PA-C sent at 3/5/2022 10:26 PM EST -----  Regarding: HIV Screening  03/05/22 10:26 PM    Hello, our patient Gallito Graham has had HIV completed/performed  Please assist in updating the patient chart by pulling the Care Everywhere (CE) document  The date of service is 2/7/2022       Thank you,  VIOLETTA Grant

## 2022-03-07 NOTE — TELEPHONE ENCOUNTER
Upon review of the In Basket request we were able to locate, review, and update the patient chart as requested for Hepatitis C , HIV and Pap Smear (HPV) aka Cervical Cancer Screening  Any additional questions or concerns should be emailed to the Practice Liaisons via [email or web address] email, please do not reply via In Basket      Thank you  Nesha Hernandez

## 2022-03-07 NOTE — TELEPHONE ENCOUNTER
----- Message from Jesse Mckeon PA-C sent at 3/5/2022 10:27 PM EST -----  Regarding: PAP  03/05/22 10:27 PM    Hello, our patient Elenita Worthy has had Pap Smear (HPV) aka Cervical Cancer Screening completed/performed  Please assist in updating the patient chart by pulling the Care Everywhere (CE) document  The date of service is 1/18/2022       Thank you,  VIOLETTA Grant

## 2022-06-30 LAB — EXTERNAL GROUP B STREP ANTIGEN: NEGATIVE

## 2022-07-06 ENCOUNTER — HOSPITAL ENCOUNTER (INPATIENT)
Facility: HOSPITAL | Age: 27
LOS: 2 days | Discharge: HOME | DRG: 560 | End: 2022-07-08
Attending: OBSTETRICS & GYNECOLOGY | Admitting: OBSTETRICS & GYNECOLOGY
Payer: COMMERCIAL

## 2022-07-06 ENCOUNTER — ANESTHESIA EVENT (INPATIENT)
Dept: ANESTHESIOLOGY | Facility: HOSPITAL | Age: 27
DRG: 560 | End: 2022-07-06
Payer: COMMERCIAL

## 2022-07-06 ENCOUNTER — ANESTHESIA (INPATIENT)
Dept: ANESTHESIOLOGY | Facility: HOSPITAL | Age: 27
DRG: 560 | End: 2022-07-06
Payer: COMMERCIAL

## 2022-07-06 DIAGNOSIS — Z3A.37 37 WEEKS GESTATION OF PREGNANCY: ICD-10-CM

## 2022-07-06 DIAGNOSIS — R82.5 POSITIVE URINE DRUG SCREEN: ICD-10-CM

## 2022-07-06 PROBLEM — J45.909 ASTHMA: Status: ACTIVE | Noted: 2022-07-06

## 2022-07-06 LAB
ABO GROUP BLD: NORMAL
AMPHETAMINES SERPL QL SCN: NEGATIVE
BARBITURATES UR QL: NEGATIVE
BENZODIAZ UR QL: NEGATIVE
BLD GP AB SCN SERPL QL: NEGATIVE
COCAINE UR QL: NEGATIVE
ERYTHROCYTE [DISTWIDTH] IN BLOOD BY AUTOMATED COUNT: 13.5 % (ref 11.6–15.1)
HCT VFR BLD AUTO: 36 % (ref 34.8–46.1)
HGB BLD-MCNC: 11.6 G/DL (ref 11.5–15.4)
MCH RBC QN AUTO: 26.1 PG (ref 26.8–34.3)
MCHC RBC AUTO-ENTMCNC: 32.2 G/DL (ref 31.4–37.4)
MCV RBC AUTO: 81 FL (ref 82–98)
METHADONE UR QL: NEGATIVE
OPIATES UR QL SCN: NEGATIVE
OXYCODONE+OXYMORPHONE UR QL SCN: NEGATIVE
PCP UR QL: NEGATIVE
PLATELET # BLD AUTO: 305 THOUSANDS/UL (ref 149–390)
PMV BLD AUTO: 9.7 FL (ref 8.9–12.7)
RBC # BLD AUTO: 4.45 MILLION/UL (ref 3.81–5.12)
RH BLD: POSITIVE
RPR SER QL: NORMAL
SPECIMEN EXPIRATION DATE: NORMAL
THC UR QL: POSITIVE
WBC # BLD AUTO: 10.73 THOUSAND/UL (ref 4.31–10.16)

## 2022-07-06 PROCEDURE — NC001 PR NO CHARGE: Performed by: OBSTETRICS & GYNECOLOGY

## 2022-07-06 PROCEDURE — 80307 DRUG TEST PRSMV CHEM ANLYZR: CPT

## 2022-07-06 PROCEDURE — 99202 OFFICE O/P NEW SF 15 MIN: CPT

## 2022-07-06 PROCEDURE — 4A1HXCZ MONITORING OF PRODUCTS OF CONCEPTION, CARDIAC RATE, EXTERNAL APPROACH: ICD-10-PCS | Performed by: OBSTETRICS & GYNECOLOGY

## 2022-07-06 PROCEDURE — 85027 COMPLETE CBC AUTOMATED: CPT

## 2022-07-06 PROCEDURE — 86592 SYPHILIS TEST NON-TREP QUAL: CPT

## 2022-07-06 PROCEDURE — 86900 BLOOD TYPING SEROLOGIC ABO: CPT

## 2022-07-06 PROCEDURE — 86901 BLOOD TYPING SEROLOGIC RH(D): CPT

## 2022-07-06 PROCEDURE — 86850 RBC ANTIBODY SCREEN: CPT

## 2022-07-06 RX ORDER — ROPIVACAINE HYDROCHLORIDE 2 MG/ML
INJECTION, SOLUTION EPIDURAL; INFILTRATION; PERINEURAL CONTINUOUS PRN
Status: DISCONTINUED | OUTPATIENT
Start: 2022-07-06 | End: 2022-07-07 | Stop reason: HOSPADM

## 2022-07-06 RX ORDER — ONDANSETRON 2 MG/ML
4 INJECTION INTRAMUSCULAR; INTRAVENOUS EVERY 6 HOURS PRN
Status: DISCONTINUED | OUTPATIENT
Start: 2022-07-06 | End: 2022-07-07

## 2022-07-06 RX ORDER — ROPIVACAINE HYDROCHLORIDE 2 MG/ML
INJECTION, SOLUTION EPIDURAL; INFILTRATION; PERINEURAL
Status: COMPLETED | OUTPATIENT
Start: 2022-07-06 | End: 2022-07-06

## 2022-07-06 RX ORDER — ALBUTEROL SULFATE 90 UG/1
2 AEROSOL, METERED RESPIRATORY (INHALATION) EVERY 6 HOURS PRN
COMMUNITY

## 2022-07-06 RX ORDER — ROPIVACAINE HYDROCHLORIDE 5 MG/ML
INJECTION, SOLUTION EPIDURAL; INFILTRATION; PERINEURAL AS NEEDED
Status: DISCONTINUED | OUTPATIENT
Start: 2022-07-06 | End: 2022-07-07 | Stop reason: HOSPADM

## 2022-07-06 RX ORDER — DIPHENHYDRAMINE HYDROCHLORIDE 50 MG/ML
12.5 INJECTION INTRAMUSCULAR; INTRAVENOUS EVERY 6 HOURS PRN
Status: DISCONTINUED | OUTPATIENT
Start: 2022-07-06 | End: 2022-07-07

## 2022-07-06 RX ORDER — CHLOROPROCAINE HYDROCHLORIDE 30 MG/ML
INJECTION, SOLUTION EPIDURAL; INFILTRATION; INTRACAUDAL; PERINEURAL AS NEEDED
Status: DISCONTINUED | OUTPATIENT
Start: 2022-07-06 | End: 2022-07-07 | Stop reason: HOSPADM

## 2022-07-06 RX ORDER — ROPIVACAINE HYDROCHLORIDE 2 MG/ML
INJECTION, SOLUTION EPIDURAL; INFILTRATION; PERINEURAL
Status: COMPLETED
Start: 2022-07-06 | End: 2022-07-06

## 2022-07-06 RX ORDER — OXYTOCIN/RINGER'S LACTATE 30/500 ML
1-30 PLASTIC BAG, INJECTION (ML) INTRAVENOUS
Status: DISCONTINUED | OUTPATIENT
Start: 2022-07-06 | End: 2022-07-07

## 2022-07-06 RX ORDER — SODIUM CHLORIDE, SODIUM LACTATE, POTASSIUM CHLORIDE, CALCIUM CHLORIDE 600; 310; 30; 20 MG/100ML; MG/100ML; MG/100ML; MG/100ML
125 INJECTION, SOLUTION INTRAVENOUS CONTINUOUS
Status: DISCONTINUED | OUTPATIENT
Start: 2022-07-06 | End: 2022-07-08 | Stop reason: HOSPADM

## 2022-07-06 RX ORDER — CALCIUM CARBONATE 200(500)MG
500 TABLET,CHEWABLE ORAL DAILY PRN
Status: DISCONTINUED | OUTPATIENT
Start: 2022-07-06 | End: 2022-07-07

## 2022-07-06 RX ADMIN — SODIUM CHLORIDE, SODIUM LACTATE, POTASSIUM CHLORIDE, AND CALCIUM CHLORIDE 125 ML/HR: .6; .31; .03; .02 INJECTION, SOLUTION INTRAVENOUS at 22:02

## 2022-07-06 RX ADMIN — SODIUM CHLORIDE, SODIUM LACTATE, POTASSIUM CHLORIDE, AND CALCIUM CHLORIDE 125 ML/HR: .6; .31; .03; .02 INJECTION, SOLUTION INTRAVENOUS at 11:19

## 2022-07-06 RX ADMIN — CHLOROPROCAINE HYDROCHLORIDE 4 MG: 30 INJECTION, SOLUTION EPIDURAL; INFILTRATION; INTRACAUDAL; PERINEURAL at 23:33

## 2022-07-06 RX ADMIN — Medication 2 MILLI-UNITS/MIN: at 11:18

## 2022-07-06 RX ADMIN — CHLOROPROCAINE HYDROCHLORIDE 4 MG: 30 INJECTION, SOLUTION EPIDURAL; INFILTRATION; INTRACAUDAL; PERINEURAL at 22:25

## 2022-07-06 RX ADMIN — CALCIUM CARBONATE (ANTACID) CHEW TAB 500 MG 500 MG: 500 CHEW TAB at 17:59

## 2022-07-06 RX ADMIN — ROPIVACAINE HYDROCHLORIDE 10 ML: 2 INJECTION, SOLUTION EPIDURAL; INFILTRATION at 12:10

## 2022-07-06 RX ADMIN — ROPIVACAINE HYDROCHLORIDE 10 ML/HR: 2 INJECTION, SOLUTION EPIDURAL; INFILTRATION; PERINEURAL at 12:23

## 2022-07-06 RX ADMIN — SODIUM CHLORIDE, SODIUM LACTATE, POTASSIUM CHLORIDE, AND CALCIUM CHLORIDE 125 ML/HR: .6; .31; .03; .02 INJECTION, SOLUTION INTRAVENOUS at 14:08

## 2022-07-06 RX ADMIN — ROPIVACAINE HYDROCHLORIDE: 2 INJECTION, SOLUTION EPIDURAL; INFILTRATION at 20:33

## 2022-07-06 RX ADMIN — ROPIVACAINE HYDROCHLORIDE 4 MG: 5 INJECTION EPIDURAL; INFILTRATION; PERINEURAL at 23:33

## 2022-07-06 RX ADMIN — ROPIVACAINE HYDROCHLORIDE 4 MG: 5 INJECTION EPIDURAL; INFILTRATION; PERINEURAL at 22:25

## 2022-07-06 NOTE — H&P
Maikel [address] 27 y o  female MRN: [id number]  Unit/Bed#: L&D 325-01 Encounter: [id number]    Assessment: 27 y o   at 37w5d admitted for spontaneous rupture of membranes  SVE: 2-3/80/-2  FHT: 130s  Vertex confirmed by TAUS  GBS status: negative   Postpartum contraception plan: undecided    Plan:   Asthma  Assessment & Plan  Albuterol PRN     37 weeks gestation of pregnancy  Assessment & Plan  SROM vs PROM at 0430 this AM  Irregular contractions   Grossly ruptured on exam   SVE 2-3/80/-2  Blood type O positive, rhogam not indicated   GBS negative, abx not indicated   Clear liquid diet    cc/hr   UDS for hx of THC use   Anesthesia per maternal request    Depression  Assessment & Plan  Currently not on medication  Hx of Suicidal ideation         Discussed case and plan w/ Dr Zach Pineda      Chief Complaint: leaking fluid    HPI: Annette Morales is a 27 y o   with an KATHRINE of 2022, by Other Basis at 37w5d who is being admitted for spontaneous rupture of membranes  She complains of uterine contractions, occurring irregularly, has moderate LOF, and reports no VB  She states she has felt good FM  Raul Manifold Patient Active Problem List   Diagnosis    Incidental pregnancy    Depression    37 weeks gestation of pregnancy    Asthma       Baby complications/comments: none    Review of Systems   Constitutional: Negative  HENT: Negative  Eyes: Negative  Respiratory: Negative  Cardiovascular: Negative  Gastrointestinal: Positive for abdominal pain  Occasional abdominal pain   Endocrine: Negative  Genitourinary:        Leakage of fluid     Musculoskeletal: Negative  Skin: Negative  Allergic/Immunologic: Negative  Neurological: Negative  Hematological: Negative  Psychiatric/Behavioral: Negative          OB Hx:  OB History    Para Term  AB Living   1             SAB IAB Ectopic Multiple Live Births                  # Outcome Date GA Lbr Thomas/ Weight Sex Delivery Anes PTL Lv   1 Current                Past Medical Hx:  Past Medical History:   Diagnosis Date    Asthma     Migraine        Past Surgical hx:  No past surgical history on file  No Known Allergies    Medications Prior to Admission   Medication    albuterol (PROVENTIL HFA,VENTOLIN HFA) 90 mcg/act inhaler    ibuprofen (MOTRIN) 600 mg tablet    menthol-cetylpyridinium (CEPACOL) 3 MG lozenge       Objective:  Temp:  [98 3 °F (36 8 °C)] 98 3 °F (36 8 °C)  HR:  [96] 96  Resp:  [16] 16  BP: (115)/(76) 115/76  Body mass index is 42 02 kg/m²  Physical Exam:  Physical Exam  Constitutional:       General: She is not in acute distress  Appearance: Normal appearance  HENT:      Head: Normocephalic and atraumatic  Cardiovascular:      Rate and Rhythm: Normal rate  Pulmonary:      Effort: Pulmonary effort is normal    Abdominal:      Palpations: Abdomen is soft  Tenderness: There is no abdominal tenderness  Comments: Gravid   Neurological:      General: No focal deficit present  Mental Status: She is alert  Skin:     General: Skin is warm and dry     Psychiatric:         Mood and Affect: Mood normal             FHT:  Baseline 135  Variability: Moderate (6-25 bpm)  Accelerations: 15x15 at variable times   Decelerations: none     TOCO:   Irregular contractions    Lab Results   Component Value Date    WBC 9 59 04/04/2017    HGB 13 7 04/04/2017    HCT 41 8 04/04/2017     04/04/2017     Lab Results   Component Value Date    K 3 6 04/04/2017     04/04/2017    CO2 27 04/04/2017    BUN 10 04/04/2017    CREATININE 0 67 04/04/2017    GLUCOSE 90 01/08/2017    AST 16 04/04/2017    ALT 28 04/04/2017     Prenatal Labs: Reviewed      Blood type: O positive  Antibody: Negative  GBS: Negative  HIV: Non-reactive  Rubella: Immune  VDRL/RPR: Non reactive  HBsAg: Negative  Chlamydia: Unknown  Gonorrhea: Unknown  Diabetes 1 hour screen: 119  Platelets: 922  Hgb: 12 2  >2 Midnights  INPATIENT     Signature/Title: Chinyere Hamm MD  Date: 7/6/2022  Time: 5:37 AM no

## 2022-07-06 NOTE — OB LABOR/OXYTOCIN SAFETY PROGRESS
Oxytocin Safety Progress Check Note - Linsey Mistry 27 y o  female MRN: [id number]    Unit/Bed#: L&D 325-01 Encounter: [id number]    Dose (ila-units/min) Oxytocin: 10 ila-units/min  Contraction Frequency (minutes): 2-3  Contraction Quality: Mild  Tachysystole: No   Cervical Dilation: 5        Cervical Effacement: 100  Fetal Station: -2  Baseline Rate: 135 bpm  Fetal Heart Rate: 144 BPM  FHR Category: Category I               Vital Signs:   Vitals:    07/06/22 1502   BP: 113/68   Pulse: 90   Resp:    Temp:        Notes/comments:   SVE: 5/100/-2    FHT: Category 1  Pit: 10mU/min  [place]: 2-3 min    Comfortable with epidural  Continue to titrate up pitocin as tolerated        Alex Harrell MD 7/6/2022 3:42 PM

## 2022-07-06 NOTE — PROGRESS NOTES
Progress Note - OB/GYN   Lakia Hampton 27 y o  female MRN: [id number]  Unit/Bed#: L&D 325-01 Encounter: [id number]    S/Comfortable  Vitals: Blood pressure 115/76, pulse 96, temperature 98 3 °F (36 8 °C), resp  rate 16, height 5' 3 5" (1 613 m), weight 109 kg (241 lb), not currently breastfeeding  ,Body mass index is 42 02 kg/m²  No intake or output data in the 24 hours ending 07/06/22 0842    Invasive Devices  Timeline    Peripheral Intravenous Line  Duration           Peripheral IV 07/06/22 Left Hand <1 day              O/115/76  Alert comfortable NAD  SVE 2-3/80/-2     moderate variability (+)accels, no decels  Morton q4-7"  Irregular    A/P  19yo primigravida at 37 5 weeks gestational age, here with SRoM  (1) SRoM/latent labor  Irregular ccontractions, and no cervical change since admission  Recommend augmentation and discussed rationale, risk of infection  She declines at this point, open to considering again later  R/B/A oxytocin augmentation d/w her, all qs answered  --> Expectant management for now  (2) Fetal status category I   --> Continuous fetal monitoring      Juan Antonio Redmond MD

## 2022-07-06 NOTE — OB LABOR/OXYTOCIN SAFETY PROGRESS
Oxytocin Safety Progress Check Note - Jestine Day 27 y o  female MRN: [id number]    Unit/Bed#: L&D 325-01 Encounter: [id number]    Dose (ila-units/min) Oxytocin: 14 ila-units/min  Contraction Frequency (minutes): 2-2 5  Contraction Quality: Mild  Tachysystole: No   Cervical Dilation: 5        Cervical Effacement: 90  Fetal Station: -2  Baseline Rate: 130 bpm  Fetal Heart Rate: 144 BPM  FHR Category: Category I               Vital Signs:   Vitals:    07/06/22 1801   BP: 128/82   Pulse: 94   Resp:    Temp:        Notes/comments:   Patient reports that she is becoming more uncomfortable  SVE unchanged at this time  Will place on peanut ball or in throne position to aid in fetal descent  Continue pitocin titration       Linden Angulo MD 7/6/2022 6:35 PM

## 2022-07-06 NOTE — ANESTHESIA PROCEDURE NOTES
Epidural Block    Patient location during procedure: OB  Start time: 7/6/2022 12:10 PM  Reason for block: at surgeon's request  Staffing  Performed: Anesthesiologist   Anesthesiologist: Manny Chen DO  Preanesthetic Checklist  Completed: patient identified, IV checked, site marked, risks and benefits discussed, surgical consent, monitors and equipment checked, pre-op evaluation and timeout performed  Epidural  Patient position: sitting  Prep: Betadine  Patient monitoring: heart rate and frequent blood pressure checks  Approach: midline  Location: lumbar  Injection technique: CORINA air  Needle  Needle type: Tuohy   Needle gauge: 18 G  Catheter type: side hole  Catheter size: 20 G  Catheter securement method: surgical tape  Test dose: negativeropivacaine (NAROPIN) 0 2% - Epidural   10 mL - 7/6/2022 12:10:00 PM

## 2022-07-06 NOTE — PROGRESS NOTES
Progress Note - OB/GYN   Dov Gilliam 27 y o  female MRN: [id number]  Unit/Bed#: L&D 325-01 Encounter: [id number]    S/Comfortable with epidural     Vitals: Blood pressure 98/59, pulse 85, temperature 98 °F (36 7 °C), resp  rate 16, height 5' 3 5" (1 613 m), weight 109 kg (241 lb), not currently breastfeeding  ,Body mass index is 42 02 kg/m²  No intake or output data in the 24 hours ending 07/06/22 1737    Invasive Devices  Timeline    Peripheral Intravenous Line  Duration           Peripheral IV 07/06/22 Left Hand <1 day          Epidural Line  Duration           Epidural Catheter 07/06/22 <1 day          Drain  Duration           Urethral Catheter Double-lumen 16 Fr  <1 day              O/98/59  Alert comfortable NAD  SVE 5/90/-2     moderate variability (+)accels, no decels  Danielson q2-5" irregular    A/P  25yo primigravida at 37 5 weeks gestational age, here with SRoM/early labor  (1) Labor, latent phase  Still 5cm dilated    Oxytocin at 14mu/min, but UCs irregular q2-5"   --> Continue oxytocin per protocol      (2) Fetal status category I   --> Continuous fetal monitoring      Jass Bowman MD

## 2022-07-06 NOTE — ANESTHESIA PREPROCEDURE EVALUATION
Procedure:  LABOR ANALGESIA    Relevant Problems   GYN   (+) 37 weeks gestation of pregnancy   (+) Incidental pregnancy      NEURO/PSYCH   (+) Depression      PULMONARY   (+) Asthma        Physical Exam    Airway    Mallampati score: II  TM Distance: >3 FB  Neck ROM: full     Dental       Cardiovascular  Rhythm: regular, Rate: normal,     Pulmonary  Breath sounds clear to auscultation,     Other Findings        Anesthesia Plan  ASA Score- 2     Anesthesia Type- epidural with ASA Monitors  Additional Monitors:   Airway Plan:           Plan Factors-Exercise tolerance (METS): >4 METS  Chart reviewed  Existing labs reviewed  Patient summary reviewed  Patient is not a current smoker  Patient not instructed to abstain from smoking on day of procedure  Patient did not smoke on day of surgery  Obstructive sleep apnea risk education given perioperatively  Induction- intravenous  Postoperative Plan-     Informed Consent- Anesthetic plan and risks discussed with patient

## 2022-07-06 NOTE — ASSESSMENT & PLAN NOTE
Lochia WNL   Recovering well   Appropriate bowel and bladder function   Pain well controlled   Tolerating diet   Bottle feeding   Ambulating without issues   No lower extremity tenderness  GBS neg  Rh pos

## 2022-07-06 NOTE — PROGRESS NOTES
Progress Note - OB/GYN   Yaa Nguyen 27 y o  female MRN: [id number]  Unit/Bed#: L&D 325-01 Encounter: [id number]    S/Uncomfortable with contractions  Vitals: Blood pressure 115/76, pulse 96, temperature 98 3 °F (36 8 °C), resp  rate 16, height 5' 3 5" (1 613 m), weight 109 kg (241 lb), not currently breastfeeding  ,Body mass index is 42 02 kg/m²  No intake or output data in the 24 hours ending 22 1113    Invasive Devices  Timeline    Peripheral Intravenous Line  Duration           Peripheral IV 22 Left Hand <1 day              O/115/76  Alert comfortable NAD  SVE 2-3/80/-2      moderate variability (+)accels, no decels  Chambers q4-7"  Irregular     A/P  25yo primigravida at 37 5 weeks gestational age, here with SRoM  (1) PRoM  Irregular contractions, and no cervical change since admission  Again recommend augmentation/induction  R/B/A oxytocin inductionon d/w her, including risk of uterine rupture/hysterectomy/fetal or maternal death, risk of fetal stress and injury, as well as the risk of a prolonged induction ending in  section  All qs answered, wishes to proceed    --> Oxytocin      (2) Fetal status category I   --> Continuous fetal monitoring      Payal Savage MD

## 2022-07-06 NOTE — OB LABOR/OXYTOCIN SAFETY PROGRESS
Oxytocin Safety Progress Check Note - Linsey Mistry 27 y o  female MRN: [id number]    Unit/Bed#: L&D 325-01 Encounter: [id number]    Dose (ila-units/min) Oxytocin: 8 ila-units/min  Contraction Frequency (minutes): 3-4  Contraction Quality: Mild  Tachysystole: No   Cervical Dilation: 2-3        Cervical Effacement: 80  Fetal Station: -2  Baseline Rate: 135 bpm  Fetal Heart Rate: 144 BPM  FHR Category: Category I               Vital Signs:   Vitals:    07/06/22 1417   BP: 113/73   Pulse: 86   Resp:    Temp:        Notes/comments:   Evaluated patient after 2 hours  Comfortable with epidural  SVE deferred at this time, Category I FHT  Continue to monitor and continue pitocin titration      Discussed with Dr Laura Pelayo MD 7/6/2022 2:33 PM

## 2022-07-07 LAB
BASE EXCESS BLDCOA CALC-SCNC: -13 MMOL/L (ref 3–11)
BASE EXCESS BLDCOV CALC-SCNC: -9.8 MMOL/L (ref 1–9)
HCO3 BLDCOA-SCNC: 16 MMOL/L (ref 17.3–27.3)
HCO3 BLDCOV-SCNC: 16.5 MMOL/L (ref 12.2–28.6)
O2 CT VFR BLDCOA CALC: 11.5 ML/DL
OXYHGB MFR BLDCOA: 60.1 %
OXYHGB MFR BLDCOV: 74.4 %
PCO2 BLDCOA: 48.9 MM[HG] (ref 30–60)
PCO2 BLDCOV: 37.5 MM HG (ref 27–43)
PH BLDCOA: 7.13 [PH] (ref 7.23–7.43)
PH BLDCOV: 7.26 [PH] (ref 7.19–7.49)
PO2 BLDCOA: 30.4 MM HG (ref 5–25)
PO2 BLDCOV: 33.8 MM HG (ref 15–45)
SAO2 % BLDCOV: 14.6 ML/DL

## 2022-07-07 PROCEDURE — 0HQ9XZZ REPAIR PERINEUM SKIN, EXTERNAL APPROACH: ICD-10-PCS | Performed by: OBSTETRICS & GYNECOLOGY

## 2022-07-07 PROCEDURE — NC001 PR NO CHARGE: Performed by: OBSTETRICS & GYNECOLOGY

## 2022-07-07 PROCEDURE — 82805 BLOOD GASES W/O2 SATURATION: CPT

## 2022-07-07 RX ORDER — DIAPER,BRIEF,INFANT-TODD,DISP
1 EACH MISCELLANEOUS DAILY PRN
Status: DISCONTINUED | OUTPATIENT
Start: 2022-07-07 | End: 2022-07-08 | Stop reason: HOSPADM

## 2022-07-07 RX ORDER — DOCUSATE SODIUM 100 MG/1
100 CAPSULE, LIQUID FILLED ORAL 2 TIMES DAILY
Status: DISCONTINUED | OUTPATIENT
Start: 2022-07-07 | End: 2022-07-08 | Stop reason: HOSPADM

## 2022-07-07 RX ORDER — IBUPROFEN 600 MG/1
600 TABLET ORAL EVERY 6 HOURS
Status: DISCONTINUED | OUTPATIENT
Start: 2022-07-07 | End: 2022-07-08 | Stop reason: HOSPADM

## 2022-07-07 RX ORDER — ACETAMINOPHEN 325 MG/1
650 TABLET ORAL EVERY 4 HOURS PRN
Status: DISCONTINUED | OUTPATIENT
Start: 2022-07-07 | End: 2022-07-08 | Stop reason: HOSPADM

## 2022-07-07 RX ORDER — SIMETHICONE 80 MG
80 TABLET,CHEWABLE ORAL 4 TIMES DAILY PRN
Status: DISCONTINUED | OUTPATIENT
Start: 2022-07-07 | End: 2022-07-08 | Stop reason: HOSPADM

## 2022-07-07 RX ORDER — ONDANSETRON 2 MG/ML
4 INJECTION INTRAMUSCULAR; INTRAVENOUS EVERY 8 HOURS PRN
Status: DISCONTINUED | OUTPATIENT
Start: 2022-07-07 | End: 2022-07-08 | Stop reason: HOSPADM

## 2022-07-07 RX ORDER — CALCIUM CARBONATE 200(500)MG
1000 TABLET,CHEWABLE ORAL DAILY PRN
Status: DISCONTINUED | OUTPATIENT
Start: 2022-07-07 | End: 2022-07-08 | Stop reason: HOSPADM

## 2022-07-07 RX ORDER — DIPHENHYDRAMINE HCL 25 MG
25 TABLET ORAL EVERY 6 HOURS PRN
Status: DISCONTINUED | OUTPATIENT
Start: 2022-07-07 | End: 2022-07-08 | Stop reason: HOSPADM

## 2022-07-07 RX ADMIN — DOCUSATE SODIUM 100 MG: 100 CAPSULE, LIQUID FILLED ORAL at 18:00

## 2022-07-07 RX ADMIN — IBUPROFEN 600 MG: 600 TABLET, FILM COATED ORAL at 09:50

## 2022-07-07 RX ADMIN — IBUPROFEN 600 MG: 600 TABLET, FILM COATED ORAL at 03:41

## 2022-07-07 RX ADMIN — DOCUSATE SODIUM 100 MG: 100 CAPSULE, LIQUID FILLED ORAL at 09:50

## 2022-07-07 RX ADMIN — WITCH HAZEL 1 PAD: 500 SOLUTION RECTAL; TOPICAL at 03:41

## 2022-07-07 RX ADMIN — SIMETHICONE 80 MG: 80 TABLET, CHEWABLE ORAL at 15:01

## 2022-07-07 RX ADMIN — BENZOCAINE AND LEVOMENTHOL 1 APPLICATION: 200; 5 SPRAY TOPICAL at 03:41

## 2022-07-07 RX ADMIN — IBUPROFEN 600 MG: 600 TABLET, FILM COATED ORAL at 18:00

## 2022-07-07 NOTE — ANESTHESIA POSTPROCEDURE EVALUATION
Post-Op Assessment Note    CV Status:  Stable  Pain Score: 1    Pain management: adequate     Mental Status:  Alert and awake   Hydration Status:  Euvolemic   PONV Controlled:  Controlled   Airway Patency:  Patent      Post Op Vitals Reviewed: Yes        Post-op block assessment: no complications and catheter intact      No complications documented      BP      Temp      Pulse     Resp      SpO2      /63   Pulse 96   Temp 98 5 °F (36 9 °C)   Resp 16   Ht 5' 3 5" (1 613 m)   Wt 109 kg (241 lb)   LMP  (LMP Unknown)   SpO2 98%   Breastfeeding Unknown   BMI 42 02 kg/m²

## 2022-07-07 NOTE — OB LABOR/OXYTOCIN SAFETY PROGRESS
Oxytocin Safety Progress Check Note - Chantal Arellano 27 y o  female MRN: [id number]    Unit/Bed#: L&D 325-01 Encounter: [id number]    Dose (ila-units/min) Oxytocin: 14 ila-units/min  Contraction Frequency (minutes): 1-2  Contraction Quality: Moderate  Tachysystole: No   Cervical Dilation: 7        Cervical Effacement: 90  Fetal Station: -1  Baseline Rate: 130 bpm  Fetal Heart Rate: 144 BPM  FHR Category: Category I               Vital Signs:   Vitals:    07/06/22 2046   BP: 122/79   Pulse: 94   Resp:    Temp:        Notes/comments:   Patient reports that she is feeling more uncomfortable  SVE at this time 7/90/-1  Will plan for epidural bolus from anesthesia  FHT remains category I  Continue pitocin titration       Chinyere Hamm MD 7/6/2022 10:21 PM

## 2022-07-07 NOTE — OB LABOR/OXYTOCIN SAFETY PROGRESS
Oxytocin Safety Progress Check Note - Corey Wang 27 y o  female MRN: [id number]    Unit/Bed#: L&D 325-01 Encounter: [id number]    Dose (ila-units/min) Oxytocin: 14 ila-units/min  Contraction Frequency (minutes): 1-2  Contraction Quality: Moderate  Tachysystole: No   Cervical Dilation: 6-7        Cervical Effacement: 90  Fetal Station: -2  Baseline Rate: 130 bpm  Fetal Heart Rate: 144 BPM  FHR Category: Category I               Vital Signs:   Vitals:    07/06/22 2046   BP: 122/79   Pulse: 94   Resp:    Temp:        Notes/comments:   Due for check  SVE at this time 6-7/90/-2  FHT remains category I   Comfortable with epidural    Continue pitocin titration   Omar Awad MD 7/6/2022 9:01 PM

## 2022-07-07 NOTE — OB LABOR/OXYTOCIN SAFETY PROGRESS
Oxytocin Safety Progress Check Note - Mckya Jackson 27 y o  female MRN: [id number]    Unit/Bed#: L&D 325-01 Encounter: [id number]    Dose (ila-units/min) Oxytocin: 14 ila-units/min  Contraction Frequency (minutes): 1-3  Contraction Quality: Moderate  Tachysystole: No   Cervical Dilation: 9        Cervical Effacement: 90  Fetal Station: 0  Baseline Rate: 130 bpm  Fetal Heart Rate: 144 BPM  FHR Category: Category I               Vital Signs:   Vitals:    07/06/22 2238   BP:    Pulse:    Resp:    Temp: 98 3 °F (36 8 °C)       Notes/comments:   Notified the patient is very uncomfortable this time  SVE 9/90/0  FHT remains category 1 will continue Pitocin titration recheck PRN      Ashley Wynne MD 7/6/2022 11:27 PM

## 2022-07-07 NOTE — CASE MANAGEMENT
Case Management Assessment    Patient name Gee Other  Location L&D 311/L&D 574-92 MRN [id number]  : 1995 Date 2022       Current Admission Date: 2022  Current Admission Diagnosis: (spontaneous vaginal delivery)   Patient Active Problem List    Diagnosis Date Noted     (spontaneous vaginal delivery) 2022    Asthma 2022    Incidental pregnancy 2022    Depression 2022      LOS (days): 1  Geometric Mean LOS (GMLOS) (days):   Days to GMLOS:     OBJECTIVE:    Risk of Unplanned Readmission Score: 3 85         Current admission status: Inpatient       Preferred Pharmacy:   PATIENT/FAMILY REPORTS NO PREFERRED PHARMACY  No address on file      [facility] [address]  [address] [facility] [address]  Phone: [phone] Fax: [phone]    Primary Care Provider: Lizet Reynolds PA-C    [facility] Insurance[facility]  Secondary Insurance:     ASSESSMENT:  Brijesh Olivas Proxies    There are no active Health Care Proxies on file  Consult(s): UDS+ THC    SW met w/MOB who provided the following information:      · Baby's name/gender: Baby Boy Carmen Frye  · Mother of baby: Elier Rowe  · Father of baby//SO: Milton Rodas  · Other Legal Guardian(s) for baby: No  · Alternate emergency contact: Maternal grandmother Carlos Mraie [phone]  · Other children: No  · Lives with: MOB lives w/ her mother, step father, and two brothers   FOB lives w/ his family  · Support System: MOB's mother, FOB's sister  · Baby Supplies: have all except formula (MOB to contact [facility] and have family p/u formula prior to or right after dc)  · Bottle or Breast Feeding: bottle  · Breast Pump if breast feeding: N/A   · Government Assistance Programs/WIC/EBT/SSI: WIC, SNAP  · Work/School: Neither MOB nor FOB working  · Transportation: MOB's mother  · Pediatrician: Unsure - CM requested that RN provide MOB w/ a [facility] List  · Mental Health Hx or Treatment: ALFIE reports hx depression, anxiety, bipolar  Not currently receiving MH tx or seeing a [facility] provider  CM offered resources, MOB declined stating she feels she is managing well and received resources OP at her OBGYN  · Substance Abuse: No  · Community Referrals/C&Y/NFP: No prev involvement per MOB; UDS+ for THC for MOB only (baby negative)  CM placed referral to [facility],  [address] [id number]  · Insurance for baby: MOB stated she plans to have baby added to [employer] insurance     MOB denies any other CM needs at this time  Encouraged family to contact SW as needed  CM to f/u w/ CYS tomorrow to determine assigned  and dc plan

## 2022-07-07 NOTE — DISCHARGE INSTRUCTIONS

## 2022-07-07 NOTE — L&D DELIVERY NOTE
Vaginal Delivery Summary - OB/GYN   Abad Wilks 27 y o  female MRN: [id number]  Unit/Bed#: L&D 325-01 Encounter: [id number]          Predelivery Diagnosis:  1  Pregnancy at 37 weeks of gestation  2  Depression   3  Asthma     Postdelivery Diagnosis:  1  Same as above  2  Delivery of term     Procedure: Spontaneous Vaginal Delivery, repair of first degree and left labial lacerations    Attending: Seth    Assistant: Salina Aguirre    Anesthesia: Epidural    QBL: 131cc  Admission H 6  Admission platelets: 391    Complications: none apparent    Specimens: cord blood, arterial and venous cord blood gasses, placenta to storage    Findings:   1  Viable male at 0139, with APGARS of 9 and 9 at 1 and 5 minutes respectively,  2  Spontaneous delivery of intact placenta   3  1 degree and right labial lacerations repaired with 3-0 vicryl rapide  4  Blood gases:    Umbilical Cord Venous Blood Gas:  Results from last 7 days   Lab Units 22  0140   PH COV  7 261   PCO2 COV mm HG 37 5   HCO3 COV mmol/L 16 5   BASE EXC COV mmol/L -9 8*   O2 CT CD VB mL/dL 14 6   O2 HGB, VENOUS CORD % 39 2     Umbilical Cord Arterial Blood Gas:  Results from last 7 days   Lab Units 22  0140   PH COA  7 133*   PCO2 COA  48 9   PO2 COA mm HG 30 4*   HCO3 COA mmol/L 16 0*   BASE EXC COA mmol/L -13 0*   O2 CONTENT CORD ART ml/dl 11 5   O2 HGB, ARTERIAL CORD % 60 1       Disposition:  Patient tolerated the procedure well and was recovering in labor and delivery room     Brief history and labor course:  Ms Abad Wilks is a 27 y o   at 39wk5d  She presented to labor and delivery after experiencing SROM at home for clear fluid  On arrival she was noted to be 2-3/80/-2  She was noted to be unchanged on two rechecks  She was started on a low dose pitocin titration and received an epidural for analgesia  She progressed to complete and began pushing       Description of procedure    After pushing for 70 minutes, at 0139 patient delivered a viable male , wt pending, apgars of 9 (1 min) and 9 (5 min)  The fetal vertex delivered spontaneously and restituted UMU  Baby was checked for nuchal  No nuchal cord was noted  The anterior shoulder (fetal right) delivered atraumatically with maternal expulsive forces and the assistance of gentle downward traction  The posterior shoulder delivered with maternal expulsive forces and the assistance of gentle upward traction  The remainder of the fetus delivered spontaneously  Upon delivery, the infant was placed on the mothers abdomen and the cord was clamped and cut  Delayed cord clamping was performed  The infant was noted to cry spontaneously and was moving all extremities appropriately  There was no evidence for injury  Awaiting nurse resuscitators evaluated the   Arterial and venous cord blood gases and cord blood was collected for analysis  These were promptly sent to the lab  In the immediate post-partum, 30 units of IV pitocin was administered, and the uterus was noted to contract down well with massage and pitocin  The placenta delivered spontaneously at 0143 and was noted to have a centrally inserted 3 vessel cord  The vagina, cervix, perineum, and rectum were inspected and there was noted to be a first degree perineal and bilateral labial lacerations  Using a 3-0 vicryl rapide, the apex of the first degree laceration was identified and an anchoring stitch was placed approximately 1 cm superior  The vaginal mucosa was reapproximated in a running locked fashion  The stitch was then brought underneath and a single transverse suture was placed  The stitch was brought to the apex of the skin defect and it was closed in a running subcuticular fashion  Attention was then turned to the bilateral labial lacerations  The left labial laceration was noted to be hemostatic and did not require repair  The left labial laceration was noted to have a small amount of bleeding   Using the remaining suture, the mucosa was reapproximated in a running nonlocked fashion  There was noted to be a small amount of bleeding from the uterus  A gentle bimanual exam was performed and the lower uterine segment was evacuated of a small amount of clots  At the conclusion of the procedure, all needle, sponge, and instrument counts were noted to be correct  Patient tolerated the procedure well and was allowed to recover in labor and delivery room with family and  before being transferred to the post-partum floor  Dr Rommel Lock was present and participated in all key portions of the case          Ashley Wynne MD  2022  2:06 AM

## 2022-07-07 NOTE — DISCHARGE SUMMARY
Discharge Summary - Julissa Mcrae 27 y o  female MRN: [id number]    Unit/Bed#: L&D 325-01 Encounter: [id number]    Admission Date: 2022     Discharge Date: 2022    Admitting Attending: Nilson Green MD   Delivering Attending: Roxy Romberg, MD   Discharge Attending: Dr Nilson Hood    Diagnosis:   1  Pregnancy at 37 weeks of gestation  2  Depression   3  Asthma     Procedures: Spontaneous Vaginal Delivery, Repair of first degree laceration, repair of right labial laceration    Complications: none apparent    Hospital Course: Ms Julissa Mcrae is a 27 y o   at 39wk5d  She presented to labor and delivery after experiencing SROM at home for clear fluid  On arrival she was noted to be 2-3/80/-2  She was noted to be unchanged on two rechecks  She was started on a low dose pitocin titration and received an epidural for analgesia  She progressed to complete and began pushing  She delivered via  with a first degree perineal and bilateral labial lacerations  She had an uncomplicated postpartum course  Condition at discharge: good     On day of discharge, patient was tolerating PO, passing flatus, urinating, and ambulating  Her pain was well controlled with oral analgesics  She was discharged home on postpartum day #1 with standard post partum instructions to follow up with her physician in 3-6 weeks for a postpartum appointment  Discharge instructions/Information to patient and family:   - Do not place anything (no partner, tampons or douche) in your vagina for 6 weeks    -You may walk for exercise for the first 6 weeks then gradually return to your usual activities    -Please do not drive for 1 week if you have no stitches and for 2 weeks if you have stitches or underwent a  delivery     -You may take baths or shower per your preference    -Please look at your bust (breasts) in the mirror daily and call for redness or tenderness or increased warmth    -Please call us for temperature > 100 4*F or 38* C, worsening pain or a foul discharge  Discharge Medications:   Prenatal vitamin daily for 6 months or the duration of nursing whichever is longer  Motrin 600 mg orally every 6 hours as needed for pain  Tylenol (over the counter) per bottle directions as needed for pain: do NOT use with percocet  Hydrocortisone cream 1% (over the counter) applied 1-2x daily to hemorrhoids as needed    Provisions for Follow-Up Care: Follow up with your doctor in 3 weeks for postpartum care       Planned Readmission: Esther Pisano MD PGY-1

## 2022-07-08 VITALS
DIASTOLIC BLOOD PRESSURE: 79 MMHG | WEIGHT: 241 LBS | TEMPERATURE: 97.3 F | BODY MASS INDEX: 41.15 KG/M2 | SYSTOLIC BLOOD PRESSURE: 116 MMHG | HEIGHT: 64 IN | RESPIRATION RATE: 18 BRPM | OXYGEN SATURATION: 100 % | HEART RATE: 73 BPM

## 2022-07-08 PROCEDURE — 99024 POSTOP FOLLOW-UP VISIT: CPT | Performed by: OBSTETRICS & GYNECOLOGY

## 2022-07-08 RX ORDER — IBUPROFEN 600 MG/1
600 TABLET ORAL EVERY 6 HOURS
Qty: 30 TABLET | Refills: 0
Start: 2022-07-08 | End: 2022-07-12

## 2022-07-08 RX ORDER — ACETAMINOPHEN 325 MG/1
650 TABLET ORAL EVERY 4 HOURS PRN
Refills: 0
Start: 2022-07-08 | End: 2022-07-12

## 2022-07-08 RX ADMIN — IBUPROFEN 600 MG: 600 TABLET, FILM COATED ORAL at 06:50

## 2022-07-08 RX ADMIN — DOCUSATE SODIUM 100 MG: 100 CAPSULE, LIQUID FILLED ORAL at 09:54

## 2022-07-08 RX ADMIN — IBUPROFEN 600 MG: 600 TABLET, FILM COATED ORAL at 13:16

## 2022-07-08 RX ADMIN — IBUPROFEN 600 MG: 600 TABLET, FILM COATED ORAL at 00:45

## 2022-07-08 NOTE — PROGRESS NOTES
Discharge education reviewed with patient  Educational packet provided as well as [facility] [employer]  Patient verbalized understanding and encouraged to continue to ask questions prior to discharge

## 2022-07-08 NOTE — CASE MANAGEMENT
Case Management Progress Note    Patient name Roberth Garvey  Location L&D 311/L&D 861-58 MRN [id number]  : 1995 Date 2022       LOS (days): 2  Geometric Mean LOS (GMLOS) (days):   Days to GMLOS:        OBJECTIVE:        Current admission status: Inpatient  Preferred Pharmacy:   PATIENT/FAMILY REPORTS NO PREFERRED PHARMACY  No address on file      [facility] [facility] [address], [place] - [address]  [facility] [address]  Phone: [phone] Fax: [phone]    Primary Care Provider: Nj Brady PA-C    [facility]  Secondary Insurance:     PROGRESS NOTE:    CM received f/u call from assigned  Delmis ([email or web address] - call [phone]) who stated she will arrive at the hospital around 11:45am to visit MOB/baby  CM informed RN and MOB  Addendum 22 at 12:17pm-  CYS  met w/ family at bedside and cleared baby for dc home w/ MOB

## 2022-07-08 NOTE — PROGRESS NOTES
Progress Note - OB/GYN  Roberth Garvey 27 y o  female MRN: [id number]  Unit/Bed#: L&D 311-01 Encounter: [id number]    Assessment and [address] is a patient of: [facility]  She is PPD# 1 s/p   Recovering well and is stable       *  (spontaneous vaginal delivery)  Assessment & Plan  Lochia WNL   Recovering well   Appropriate bowel and bladder function   Pain well controlled   Tolerating diet   Bottle feeding   Ambulating without issues   No lower extremity tenderness  GBS neg  Rh pos    Asthma  Assessment & Plan  Albuterol PRN     Depression  Assessment & Plan  Currently not on medication  Hx of Suicidal ideation       Disposition    - Anticipate discharge home on PPD# 2      Subjective/Objective     Chief Complaint: Postpartum State     Subjective:    Roberth Garvey is PPD#1 s/p   She has no current complaints  Pain is well controlled  Patient is currently voiding  She is ambulating  Patient is currently passing flatus and has had no bowel movement  She is tolerating PO, and denies nausea or vomitting  Patient denies fever, chills, chest pain, shortness of breath, or calf tenderness  Lochia is normal  She is  Bottle feeding  She is recovering well and is stable         Vitals:   /61 (BP Location: Right arm)   Pulse 80   Temp 98 °F (36 7 °C) (Oral)   Resp 18   Ht 5' 3 5" (1 613 m)   Wt 109 kg (241 lb)   LMP  (LMP Unknown)   SpO2 98%   Breastfeeding No   BMI 42 02 kg/m²       Intake/Output Summary (Last 24 hours) at 2022 0494  Last data filed at 2022 2045  Gross per 24 hour   Intake --   Output 2150 ml   Net -2150 ml       Invasive Devices  Timeline    None                 Physical Exam:   GEN: Roberth Garvey appears well, alert and oriented x 3, pleasant and cooperative   CARDIO: RRR, no murmurs or rubs  RESP:  CTAB, no wheezes or rales  ABDOMEN: soft, no tenderness, no distention, fundus @ umbilicus  EXTREMITIES:  non tender, no erythema      Labs: Hemoglobin   Date Value Ref Range Status   07/06/2022 11 6 11 5 - 15 4 g/dL Final   04/04/2017 13 7 11 5 - 15 4 g/dL Final     WBC   Date Value Ref Range Status   07/06/2022 10 73 (H) 4 31 - 10 16 Thousand/uL Final   04/04/2017 9 59 4 31 - 10 16 Thousand/uL Final     Platelets   Date Value Ref Range Status   07/06/2022 305 149 - 390 Thousands/uL Final   04/04/2017 301 149 - 390 Thousands/uL Final     Creatinine   Date Value Ref Range Status   04/04/2017 0 67 0 60 - 1 30 mg/dL Final     Comment:     Standardized to IDMS reference method   12/11/2016 0 66 0 60 - 1 30 mg/dL Final     Comment:     Standardized to IDMS reference method     AST   Date Value Ref Range Status   04/04/2017 16 5 - 45 U/L Final   12/11/2016 28 5 - 45 U/L Final     Comment:     Slightly Hemolyzed;  Results May be Affected     ALT   Date Value Ref Range Status   04/04/2017 28 12 - 78 U/L Final   12/11/2016 29 12 - 78 U/L Final          Angelina Griffiths MD   PGY1 OB/GYN  7/8/2022  6:34 AM

## 2022-07-11 NOTE — UTILIZATION REVIEW
Inpatient Admission Authorization Request   Notification of Maternity/Delivery &  Birth Information for Admission   SERVICING FACILITY:   [address] [place] [address], [place] [address] [address] [facility]  Tax ID: [id number]  NPI: [id number]  Place of Service: Inpatient [address] [facility]  [address]  Place of Service Code: 24     ATTENDING PROVIDER:  Attending Name and NPI#: Cuate Lemusma [[id number]]  Address: [address] [place] [address], [place] [address] [address] [facility]  Phone: [phone]     UTILIZATION REVIEW CONTACT:  Carolyn Roe Utilization   Network Utilization Review Department  Phone: [phone]  Fax [phone]  Email: [email or web address]     PHYSICIAN ADVISORY SERVICES:  FOR EWKD-YW-YPEP REVIEW - MEDICAL NECESSITY DENIAL  Phone: [phone]  Fax: [phone]  Email: [email or web address]     TYPE OF REQUEST:  Inpatient Status     ADMISSION INFORMATION:  ADMISSION DATE/TIME: 22  5:27 AM  PATIENT DIAGNOSIS CODE/DESCRIPTION:  37 weeks gestation of pregnancy [Z3A 37] The primary encounter diagnosis was  (spontaneous vaginal delivery)  Diagnoses of 37 weeks gestation of pregnancy and Positive urine drug screen were also pertinent to this visit  1   (spontaneous vaginal delivery)    2  37 weeks gestation of pregnancy    3  Positive urine drug screen      DISCHARGE DATE/TIME: 2022  4:20 PM   MOTHER AND  INFORMATION:  Mother: Rosanne Garza 1995   Delivering clinician: Roxanne Delacruz   OB History        1    Para   1    Term   1            AB        Living   1       SAB        IAB        Ectopic        Multiple   0    Live Births   1                Name & MRN:   Information for the patient's :  Ana Fofana) [[id number]]      Delivery Information:  Sex: male  Delivered 2022 1:39 AM by Vaginal, Spontaneous;  Gestational Age: 41w10d    [place] Measurements:  Weight: 6 lb 8 1 oz (2950 g); Height: 20 5"    APGAR 1 minute 5 minutes 10 minutes   Totals: 9 9       Birth Information: 27 y o  female MRN: [id number] Unit/Bed#: L&D 311-01 Estimated Date of Delivery: 22  Birthweight: No birth weight on file  Gestational Age: <None> Delivery Type: Vaginal, Spontaneous      IMPORTANT INFORMATION:  Please contact Kade Frye directly with any questions or concerns regarding this request  Department voicemails are confidential     Send requests for admission clinical reviews, concurrent reviews, approvals, and administrative denials due to lack of clinical to fax [phone]

## 2022-07-12 ENCOUNTER — HOSPITAL ENCOUNTER (EMERGENCY)
Facility: HOSPITAL | Age: 27
Discharge: HOME | End: 2022-07-12
Attending: EMERGENCY MEDICINE | Admitting: EMERGENCY MEDICINE
Payer: COMMERCIAL

## 2022-07-12 VITALS
OXYGEN SATURATION: 97 % | BODY MASS INDEX: 41.48 KG/M2 | DIASTOLIC BLOOD PRESSURE: 55 MMHG | SYSTOLIC BLOOD PRESSURE: 100 MMHG | WEIGHT: 237.88 LBS | HEART RATE: 109 BPM | TEMPERATURE: 98.9 F | RESPIRATION RATE: 15 BRPM

## 2022-07-12 DIAGNOSIS — U07.1 COVID-19: Primary | ICD-10-CM

## 2022-07-12 DIAGNOSIS — N61.0 MASTITIS: ICD-10-CM

## 2022-07-12 LAB
ALBUMIN SERPL BCP-MCNC: 3.5 G/DL (ref 3–5.2)
ALP SERPL-CCNC: 129 U/L (ref 43–122)
ALT SERPL W P-5'-P-CCNC: 25 U/L
AMORPH PHOS CRY URNS QL MICRO: ABNORMAL /HPF
ANION GAP SERPL CALCULATED.3IONS-SCNC: 10 MMOL/L (ref 5–14)
APTT PPP: 30 SECONDS (ref 23–37)
AST SERPL W P-5'-P-CCNC: 54 U/L (ref 14–36)
ATRIAL RATE: 121 BPM
BACTERIA UR QL AUTO: ABNORMAL /HPF
BASOPHILS # BLD MANUAL: 0 THOUSAND/UL (ref 0–0.1)
BASOPHILS NFR MAR MANUAL: 0 % (ref 0–1)
BILIRUB SERPL-MCNC: 0.77 MG/DL
BILIRUB UR QL STRIP: NEGATIVE
BUN SERPL-MCNC: 5 MG/DL (ref 5–25)
CALCIUM SERPL-MCNC: 8.2 MG/DL (ref 8.4–10.2)
CHLORIDE SERPL-SCNC: 105 MMOL/L (ref 97–108)
CLARITY UR: ABNORMAL
CO2 SERPL-SCNC: 19 MMOL/L (ref 22–30)
COLOR UR: YELLOW
CREAT SERPL-MCNC: 0.66 MG/DL (ref 0.6–1.2)
EOSINOPHIL # BLD MANUAL: 0 THOUSAND/UL (ref 0–0.4)
EOSINOPHIL NFR BLD MANUAL: 0 % (ref 0–6)
ERYTHROCYTE [DISTWIDTH] IN BLOOD BY AUTOMATED COUNT: 13.7 % (ref 11.6–15.1)
FLUAV RNA RESP QL NAA+PROBE: NEGATIVE
FLUBV RNA RESP QL NAA+PROBE: NEGATIVE
GFR SERPL CREATININE-BSD FRML MDRD: 124 ML/MIN/1.73SQ M
GLUCOSE SERPL-MCNC: 87 MG/DL (ref 70–99)
GLUCOSE UR STRIP-MCNC: NEGATIVE MG/DL
HCT VFR BLD AUTO: 33.8 % (ref 34.8–46.1)
HGB BLD-MCNC: 10.7 G/DL (ref 11.5–15.4)
HGB UR QL STRIP.AUTO: 250
INR PPP: 1.1 (ref 0.84–1.19)
KETONES UR STRIP-MCNC: NEGATIVE MG/DL
LACTATE SERPL-SCNC: 1.1 MMOL/L (ref 0.7–2)
LEUKOCYTE ESTERASE UR QL STRIP: 100
LYMPHOCYTES # BLD AUTO: 0.43 THOUSAND/UL (ref 0.6–4.47)
LYMPHOCYTES # BLD AUTO: 5 % (ref 14–44)
MCH RBC QN AUTO: 25.4 PG (ref 26.8–34.3)
MCHC RBC AUTO-ENTMCNC: 31.7 G/DL (ref 31.4–37.4)
MCV RBC AUTO: 80 FL (ref 82–98)
METAMYELOCYTES NFR BLD MANUAL: 2 % (ref 0–1)
MONOCYTES # BLD AUTO: 0.17 THOUSAND/UL (ref 0–1.22)
MONOCYTES NFR BLD: 2 % (ref 4–12)
MUCOUS THREADS UR QL AUTO: ABNORMAL
MYELOCYTES NFR BLD MANUAL: 3 % (ref 0–1)
NEUTROPHILS # BLD MANUAL: 7.54 THOUSAND/UL (ref 1.85–7.62)
NEUTS BAND NFR BLD MANUAL: 10 % (ref 0–8)
NEUTS SEG NFR BLD AUTO: 78 % (ref 43–75)
NITRITE UR QL STRIP: NEGATIVE
NON-SQ EPI CELLS URNS QL MICRO: ABNORMAL /HPF
P AXIS: 39 DEGREES
PH UR STRIP.AUTO: 7 [PH]
PLATELET # BLD AUTO: 279 THOUSANDS/UL (ref 149–390)
PLATELET BLD QL SMEAR: ADEQUATE
PMV BLD AUTO: 8.8 FL (ref 8.9–12.7)
POTASSIUM SERPL-SCNC: 4.1 MMOL/L (ref 3.6–5)
PR INTERVAL: 174 MS
PROCALCITONIN SERPL-MCNC: 0.08 NG/ML
PROT SERPL-MCNC: 7.4 G/DL (ref 5.9–8.4)
PROT UR STRIP-MCNC: NEGATIVE MG/DL
PROTHROMBIN TIME: 13.8 SECONDS (ref 11.6–14.5)
QRS AXIS: 35 DEGREES
QRSD INTERVAL: 86 MS
QT INTERVAL: 304 MS
QTC INTERVAL: 431 MS
RBC # BLD AUTO: 4.22 MILLION/UL (ref 3.81–5.12)
RBC #/AREA URNS AUTO: ABNORMAL /HPF
RBC MORPH BLD: NORMAL
RSV RNA RESP QL NAA+PROBE: NEGATIVE
SARS-COV-2 RNA RESP QL NAA+PROBE: POSITIVE
SODIUM SERPL-SCNC: 134 MMOL/L (ref 137–147)
SP GR UR STRIP.AUTO: 1.01 (ref 1–1.04)
T WAVE AXIS: 0 DEGREES
UROBILINOGEN UA: 4 MG/DL
VENTRICULAR RATE: 121 BPM
WBC # BLD AUTO: 8.57 THOUSAND/UL (ref 4.31–10.16)
WBC #/AREA URNS AUTO: ABNORMAL /HPF

## 2022-07-12 PROCEDURE — 81001 URINALYSIS AUTO W/SCOPE: CPT | Performed by: PHYSICIAN ASSISTANT

## 2022-07-12 PROCEDURE — 80053 COMPREHEN METABOLIC PANEL: CPT | Performed by: PHYSICIAN ASSISTANT

## 2022-07-12 PROCEDURE — 99285 EMERGENCY DEPT VISIT HI MDM: CPT | Performed by: PHYSICIAN ASSISTANT

## 2022-07-12 PROCEDURE — 93010 ELECTROCARDIOGRAM REPORT: CPT | Performed by: INTERNAL MEDICINE

## 2022-07-12 PROCEDURE — 84145 PROCALCITONIN (PCT): CPT | Performed by: PHYSICIAN ASSISTANT

## 2022-07-12 PROCEDURE — 0241U HB NFCT DS VIR RESP RNA 4 TRGT: CPT | Performed by: PHYSICIAN ASSISTANT

## 2022-07-12 PROCEDURE — 81003 URINALYSIS AUTO W/O SCOPE: CPT | Performed by: PHYSICIAN ASSISTANT

## 2022-07-12 PROCEDURE — 85007 BL SMEAR W/DIFF WBC COUNT: CPT | Performed by: PHYSICIAN ASSISTANT

## 2022-07-12 PROCEDURE — 85610 PROTHROMBIN TIME: CPT | Performed by: PHYSICIAN ASSISTANT

## 2022-07-12 PROCEDURE — 85730 THROMBOPLASTIN TIME PARTIAL: CPT | Performed by: PHYSICIAN ASSISTANT

## 2022-07-12 PROCEDURE — 87040 BLOOD CULTURE FOR BACTERIA: CPT | Performed by: PHYSICIAN ASSISTANT

## 2022-07-12 PROCEDURE — 85027 COMPLETE CBC AUTOMATED: CPT | Performed by: PHYSICIAN ASSISTANT

## 2022-07-12 PROCEDURE — 36415 COLL VENOUS BLD VENIPUNCTURE: CPT | Performed by: PHYSICIAN ASSISTANT

## 2022-07-12 PROCEDURE — 99284 EMERGENCY DEPT VISIT MOD MDM: CPT

## 2022-07-12 PROCEDURE — 87086 URINE CULTURE/COLONY COUNT: CPT | Performed by: PHYSICIAN ASSISTANT

## 2022-07-12 PROCEDURE — 83605 ASSAY OF LACTIC ACID: CPT | Performed by: PHYSICIAN ASSISTANT

## 2022-07-12 PROCEDURE — 93005 ELECTROCARDIOGRAM TRACING: CPT

## 2022-07-12 PROCEDURE — 85025 COMPLETE CBC W/AUTO DIFF WBC: CPT | Performed by: PHYSICIAN ASSISTANT

## 2022-07-12 RX ORDER — IBUPROFEN 600 MG/1
600 TABLET ORAL ONCE
Status: COMPLETED | OUTPATIENT
Start: 2022-07-12 | End: 2022-07-12

## 2022-07-12 RX ORDER — CEPHALEXIN 500 MG/1
500 CAPSULE ORAL EVERY 8 HOURS SCHEDULED
Qty: 30 CAPSULE | Refills: 0 | Status: SHIPPED | OUTPATIENT
Start: 2022-07-12 | End: 2022-07-22

## 2022-07-12 RX ORDER — MONTELUKAST SODIUM 10 MG/1
10 TABLET ORAL
COMMUNITY
Start: 2022-05-17 | End: 2023-05-17

## 2022-07-12 RX ORDER — ACETAMINOPHEN 325 MG/1
650 TABLET ORAL ONCE
Status: COMPLETED | OUTPATIENT
Start: 2022-07-12 | End: 2022-07-12

## 2022-07-12 RX ORDER — ASPIRIN 81 MG/1
81 TABLET, CHEWABLE ORAL DAILY
COMMUNITY
Start: 2022-02-24 | End: 2023-02-24

## 2022-07-12 RX ORDER — IBUPROFEN 400 MG/1
400 TABLET ORAL EVERY 6 HOURS PRN
Qty: 12 TABLET | Refills: 0 | Status: SHIPPED | OUTPATIENT
Start: 2022-07-12

## 2022-07-12 RX ORDER — ACETAMINOPHEN 500 MG
500 TABLET ORAL EVERY 6 HOURS PRN
Qty: 30 TABLET | Refills: 0 | Status: SHIPPED | OUTPATIENT
Start: 2022-07-12

## 2022-07-12 RX ADMIN — SODIUM CHLORIDE 1000 ML: 0.9 INJECTION, SOLUTION INTRAVENOUS at 17:15

## 2022-07-12 RX ADMIN — ACETAMINOPHEN 650 MG: 325 TABLET ORAL at 17:11

## 2022-07-12 RX ADMIN — IBUPROFEN 600 MG: 600 TABLET ORAL at 18:00

## 2022-07-12 NOTE — ED PROVIDER NOTES
History  Chief Complaint   Patient presents with    Fever - 9 weeks to 74 years     Since today, she had a baby 5 days ago   Headache     Sore throat, productive cough, oral temp T max 102, body aches, fevers and chills, dyspnea on exertion  No sick contacts, not vaccinated for COVID19  Patient reports left thigh pain, and upper back pain, breast pain  Patient states she has been having breast engorgement and has been attempting to hand express breastmilk with no effect  Patient reports she is in coordination with her care provider to help with breast feed initiation  Fever - 9 weeks to 74 years  Associated symptoms: chest pain ( b/l breast pain), chills, congestion, headaches, rhinorrhea and sore throat    Associated symptoms: no dysuria, no ear pain, no nausea, no rash and no vomiting    Headache  Associated symptoms: congestion, drainage, fever, sinus pressure and sore throat    Associated symptoms: no abdominal pain, no dizziness, no ear pain, no fatigue, no nausea, no numbness and no vomiting        Prior to Admission Medications   Prescriptions Last Dose Informant Patient Reported? Taking? albuterol (PROVENTIL HFA,VENTOLIN HFA) 90 mcg/act inhaler   Yes No   Sig: Inhale 2 puffs every 6 (six) hours as needed for wheezing   aspirin 81 mg chewable tablet   Yes Yes   Sig: Chew 81 mg daily   montelukast (SINGULAIR) 10 mg tablet   Yes Yes   Sig: Take 10 mg by mouth      Facility-Administered Medications: None       Past Medical History:   Diagnosis Date    Asthma     Migraine        No past surgical history on file  Family History   Problem Relation Age of Onset    Asthma Father     Diabetes Father      I have reviewed and agree with the history as documented      E-Cigarette/Vaping    E-Cigarette Use Never User      E-Cigarette/Vaping Substances     Social History     Tobacco Use    Smoking status: Current Some Day Smoker     Types: Cigarettes    Smokeless tobacco: Never Used   Vaping Use    Vaping Use: Never used   Substance Use Topics    Alcohol use: No    Drug use: Yes     Types: Marijuana       Review of Systems   Constitutional: Positive for chills and fever  Negative for fatigue  HENT: Positive for congestion, postnasal drip, rhinorrhea, sinus pressure and sore throat  Negative for ear pain  Eyes: Negative for redness  Respiratory: Negative for chest tightness and shortness of breath  Cardiovascular: Positive for chest pain ( b/l breast pain)  Negative for palpitations  Gastrointestinal: Negative for abdominal pain, nausea and vomiting  Genitourinary: Negative for dysuria and hematuria  Musculoskeletal: Negative  Skin: Negative for rash  Neurological: Positive for headaches  Negative for dizziness, syncope, light-headedness and numbness  Physical Exam  Physical Exam  Vitals and nursing note reviewed  Constitutional:       Appearance: She is well-developed  HENT:      Head: Normocephalic  Eyes:      General: No scleral icterus  Cardiovascular:      Rate and Rhythm: Normal rate and regular rhythm  Pulmonary:      Effort: Pulmonary effort is normal       Breath sounds: Normal breath sounds  No stridor  Chest:      Chest wall: Tenderness and edema present  Comments: Patient with bilateral tenderness, induration and erythema  No discharge is noted however the patient reports she was able to express by hand manipulation  Abdominal:      General: There is no distension  Palpations: Abdomen is soft  Tenderness: There is no abdominal tenderness  Musculoskeletal:         General: Normal range of motion  Skin:     General: Skin is warm and dry  Capillary Refill: Capillary refill takes less than 2 seconds  Neurological:      Mental Status: She is alert and oriented to person, place, and time           Vital Signs  ED Triage Vitals   Temperature Pulse Respirations Blood Pressure SpO2   07/12/22 1543 07/12/22 1543 07/12/22 1543 07/12/22 1543 07/12/22 1543   (!) 101 6 °F (38 7 °C) (!) 131 20 104/86 100 %      Temp Source Heart Rate Source Patient Position - Orthostatic VS BP Location FiO2 (%)   07/12/22 1543 07/12/22 1543 07/12/22 1543 07/12/22 1543 --   Oral Monitor Sitting Left arm       Pain Score       07/12/22 1600       7           Vitals:    07/12/22 1543 07/12/22 1806 07/12/22 1922   BP: 104/86 116/61 100/55   Pulse: (!) 131 (!) 113 (!) 109   Patient Position - Orthostatic VS: Sitting Sitting Lying         Visual Acuity  Visual Acuity    Flowsheet Row Most Recent Value   L Pupil Size (mm) 3   R Pupil Size (mm) 3          ED Medications  Medications   sodium chloride 0 9 % bolus 1,000 mL (0 mL Intravenous Stopped 7/12/22 1915)   acetaminophen (TYLENOL) tablet 650 mg (650 mg Oral Given 7/12/22 1711)   ibuprofen (MOTRIN) tablet 600 mg (600 mg Oral Given 7/12/22 1800)       Diagnostic Studies  Results Reviewed     Procedure Component Value Units Date/Time    Manual Differential(PHLEBS Do Not Order) [[id number]]  (Abnormal) Collected: 07/12/22 1712    Lab Status: Final result Specimen: Blood from Arm, Left Updated: 07/12/22 1820     Segmented % 78 %      Bands % 10 %      Lymphocytes % 5 %      Monocytes % 2 %      Eosinophils, % 0 %      Basophils % 0 %      Metamyelocytes% 2 %      Myelocytes % 3 %      Absolute Neutrophils 7 54 Thousand/uL      Lymphocytes Absolute 0 43 Thousand/uL      Monocytes Absolute 0 17 Thousand/uL      Eosinophils Absolute 0 00 Thousand/uL      Basophils Absolute 0 00 Thousand/uL      Total Counted --     RBC Morphology Normal     Platelet Estimate Adequate    Urine Microscopic [[id number]]  (Abnormal) Collected: 07/12/22 1725    Lab Status: Final result Specimen: Urine, Clean Catch Updated: 07/12/22 1801     RBC, UA 20-30 /hpf      WBC, UA 20-30 /hpf      Epithelial Cells Moderate /hpf      Bacteria, UA Moderate /hpf      AMORPH PHOSPATES Occasional /hpf      MUCUS THREADS Occasional    Urine culture [[id number]] Collected: 07/12/22 1725    Lab Status: In process Specimen: Urine, Clean Catch Updated: 07/12/22 1801    UA w Reflex to Microscopic w Reflex to Culture [[id number]]  (Abnormal) Collected: 07/12/22 1725    Lab Status: Final result Specimen: Urine, Clean Catch Updated: 07/12/22 1751     Color, UA Yellow     Clarity, UA Slightly Cloudy     Specific Gravity, UA 1 015     pH, UA 7 0     Leukocytes,  0     Nitrite, UA Negative     Protein, UA Negative mg/dl      Glucose, UA Negative mg/dl      Ketones, UA Negative mg/dl      Bilirubin, UA Negative     Occult Blood,  0     UROBILINOGEN UA 4 0 mg/dL     Protime-INR [[id number]]  (Normal) Collected: 07/12/22 1712    Lab Status: Final result Specimen: Blood from Arm, Left Updated: 07/12/22 1742     Protime 13 8 seconds      INR 1 10    APTT [[id number]]  (Normal) Collected: 07/12/22 1712    Lab Status: Final result Specimen: Blood from Arm, Left Updated: 07/12/22 1742     PTT 30 seconds     Lactic acid [[id number]]  (Normal) Collected: 07/12/22 1712    Lab Status: Final result Specimen: Blood from Arm, Left Updated: 07/12/22 1739     LACTIC ACID 1 1 mmol/L     Narrative:      Result may be elevated if tourniquet was used during collection  CBC and differential [[id number]]  (Abnormal) Collected: 07/12/22 1712    Lab Status: Final result Specimen: Blood from Arm, Left Updated: 07/12/22 1732     WBC 8 57 Thousand/uL      RBC 4 22 Million/uL      Hemoglobin 10 7 g/dL      Hematocrit 33 8 %      MCV 80 fL      MCH 25 4 pg      MCHC 31 7 g/dL      RDW 13 7 %      MPV 8 8 fL      Platelets 701 Thousands/uL     Narrative: This is an appended report  These results have been appended to a previously verified report  Blood culture #2 [[id number]] Collected: 07/12/22 1712    Lab Status:  In process Specimen: Blood from Arm, Left Updated: 07/12/22 1723    Procalcitonin [[id number]]  (Normal) Collected: 07/12/22 1631    Lab Status: Final result Specimen: Blood from Arm, Right Updated: 07/12/22 1717     Procalcitonin 0 08 ng/ml     Comprehensive metabolic panel [[id number]]  (Abnormal) Collected: 07/12/22 1631    Lab Status: Final result Specimen: Blood from Arm, Right Updated: 07/12/22 1659     Sodium 134 mmol/L      Potassium 4 1 mmol/L      Chloride 105 mmol/L      CO2 19 mmol/L      ANION GAP 10 mmol/L      BUN 5 mg/dL      Creatinine 0 66 mg/dL      Glucose 87 mg/dL      Calcium 8 2 mg/dL      AST 54 U/L      ALT 25 U/L      Alkaline Phosphatase 129 U/L      Total Protein 7 4 g/dL      Albumin 3 5 g/dL      Total Bilirubin 0 77 mg/dL      eGFR 124 ml/min/1 73sq m     Narrative:      Hemolysis  National Kidney Disease Foundation guidelines for Chronic Kidney Disease (CKD):     Stage 1 with normal or high GFR (GFR > 90 mL/min/1 73 square meters)    Stage 2 Mild CKD (GFR = 60-89 mL/min/1 73 square meters)    Stage 3A Moderate CKD (GFR = 45-59 mL/min/1 73 square meters)    Stage 3B Moderate CKD (GFR = 30-44 mL/min/1 73 square meters)    Stage 4 Severe CKD (GFR = 15-29 mL/min/1 73 square meters)    Stage 5 End Stage CKD (GFR <15 mL/min/1 73 square meters)  Note: GFR calculation is accurate only with a steady state creatinine    FLU/RSV/COVID - if FLU/RSV clinically relevant [[id number]]  (Abnormal) Collected: 07/12/22 1601    Lab Status: Final result Specimen: Nares from Nose Updated: 07/12/22 1648     SARS-CoV-2 Positive     INFLUENZA A PCR Negative     INFLUENZA B PCR Negative     RSV PCR Negative    Narrative:      FOR PEDIATRIC PATIENTS - copy/paste COVID Guidelines URL to browser: [email or web address]    SARS-CoV-2 assay is a Nucleic Acid Amplification assay intended for the  qualitative detection of nucleic acid from SARS-CoV-2 in nasopharyngeal  swabs  Results are for the presumptive identification of SARS-CoV-2 RNA      Positive results are indicative of infection with SARS-CoV-2, the virus  causing COVID-19, but do not rule out bacterial infection or co-infection  with other viruses  Laboratories within the [place] and its  territories are required to report all positive results to the appropriate  public health authorities  Negative results do not preclude SARS-CoV-2  infection and should not be used as the sole basis for treatment or other  patient management decisions  Negative results must be combined with  clinical observations, patient history, and epidemiological information  This test has not been FDA cleared or approved  This test has been authorized by FDA under an Emergency Use Authorization  (EUA)  This test is only authorized for the duration of time the  declaration that circumstances exist justifying the authorization of the  emergency use of an in vitro diagnostic tests for detection of SARS-CoV-2  virus and/or diagnosis of COVID-19 infection under section 564(b)(1) of  the Act, 21 U  S C  570EHP-1(P)(2), unless the authorization is terminated  or revoked sooner  The test has been validated but independent review by FDA  and CLIA is pending  Test performed using [employer] Gene[employer]pert: This RT-PCR assay targets N2,  a region unique to SARS-CoV-2  A conserved region in the E-gene was chosen  for pan-Sarbecovirus detection which includes SARS-CoV-2  Blood culture #1 [[id number]] Collected: 07/12/22 1631    Lab Status:  In process Specimen: Blood from Arm, Right Updated: 07/12/22 1636                 No orders to display              Procedures  ECG 12 Lead Documentation Only    Date/Time: 7/12/2022 4:50 PM  Performed by: Latoya Moore PA-C  Authorized by: Latoya Moore PA-C     Indications / Diagnosis:  SIRS  ECG reviewed by me, the ED Provider: yes    Patient location:  ED  Previous ECG:     Comparison to cardiac monitor: Yes    Interpretation:     Interpretation: normal    Rate:     ECG rate:  121    ECG rate assessment: normal    Rhythm:     Rhythm: sinus rhythm    Ectopy:     Ectopy: none    QRS: QRS axis:  Normal    QRS intervals:  Normal  Conduction:     Conduction: normal    ST segments:     ST segments:  Normal  T waves:     T waves: normal    Comments:        QRS 86                 ED Course  ED Course as of 07/12/22 2125 Tue Jul 12, 2022   1650 SARS-COV-2(!): Positive   1725 As per Dr Dunlap, patient is COVID+ should continue to breastfeed, but otherwise minimal contact with baby, mask wearing and handwashing  Continue to breastfeed, massage, warm compresses, Keflex 500mg BID for at least 10 days  1805 Bacteria, UA(!): Moderate   1805 Epithelial Cells(!): Moderate  Patient with no urinary symptoms and moderate epithelial cells  No nitrates are noted  Suspect contaminated sample  Patient will be placed on Keflex for mastitis empirically would cover UTI if patient has concurrent UTI    1817 Motrin given at 1800, will allow patient to receive rest of NSSS and recheck temperature in 1 hour  1927 Patient was reexamined at this time and informed of laboratory and/or imaging results and was found to be stable for discharge  Return to emergency department criteria was reviewed with the patient who verbalized understanding and was agreeable to discharge and the treatment plan at this time  SBIRT 20yo+    Flowsheet Row Most Recent Value   SBIRT (25 yo +)    In order to provide better care to our patients, we are screening all of our patients for alcohol and drug use  Would it be okay to ask you these screening questions? No Filed at: 07/12/2022 1600                    MDM  Number of Diagnoses or Management Options  Diagnosis management comments: All imaging and/or lab testing discussed with patient, strict return to ED precautions discussed  Patient and/or family members verbalizes understanding and agrees with plan  Patient is stable for discharge     Portions of the record may have been created with voice recognition software   Occasional wrong word or "sound a like" substitutions may have occurred due to the inherent limitations of voice recognition software  Read the chart carefully and recognize, using context, where substitutions have occurred  Disposition  Final diagnoses:   COVID-19   Mastitis     Time reflects when diagnosis was documented in both MDM as applicable and the Disposition within this note     Time User Action Codes Description Comment    2022  5:57 PM Alfa Armenta Add [U07 1] COVID-19     2022  5:57 PM Alfa Armenta Add [N61 0] Mastitis       ED Disposition     ED Disposition   Discharge    Condition   Good    Date/Time     5:57 PM    Comment   Marlen Jose discharge to home/self care                 Follow-up Information     Follow up With Specialties Details Why Contact Info    Soheila Nuñez PA-C Family Medicine Schedule an appointment as soon as possible for a visit in 2 days As needed [address] [facility]  [address] [address]  [phone]      [address] [place] [address] Ob/Gyn Obstetrics and Gynecology Schedule an appointment as soon as possible for a visit in 2 days As needed [facility] [address]  [phone]            Discharge Medication List as of 2022  7:28 PM      START taking these medications    Details   acetaminophen (TYLENOL) 500 mg tablet Take 1 tablet (500 mg total) by mouth every 6 (six) hours as needed (pain), Starting 2022, Normal      cephalexin (KEFLEX) 500 mg capsule Take 1 capsule (500 mg total) by mouth every 8 (eight) hours for 10 days, Starting 2022, Until 2022, Normal      ibuprofen (MOTRIN) 400 mg tablet Take 1 tablet (400 mg total) by mouth every 6 (six) hours as needed for mild pain, Starting 2022, Normal      Prenatal Multivit-Min-Fe-FA (Pre-Dania Formula) TABS Take 1 tablet by mouth daily, Starting 2022, Until Thu 2022, Normal         CONTINUE these medications which have NOT CHANGED Details   aspirin 81 mg chewable tablet Chew 81 mg daily, Starting Thu 2/24/2022, Until Fri 2/24/2023, Historical Med      montelukast (SINGULAIR) 10 mg tablet Take 10 mg by mouth, Starting Tue 5/17/2022, Until Wed 5/17/2023 at 2359, Historical Med      albuterol (PROVENTIL HFA,VENTOLIN HFA) 90 mcg/act inhaler Inhale 2 puffs every 6 (six) hours as needed for wheezing, Historical Med             No discharge procedures on file      PDMP Review     None          ED Provider  Electronically Signed by           Tom Ramos PA-C  07/12/22 1501

## 2022-07-14 LAB — BACTERIA UR CULT: NORMAL

## 2022-07-15 LAB — PLACENTA IN STORAGE: NORMAL

## 2022-07-17 LAB
BACTERIA BLD CULT: NORMAL
BACTERIA BLD CULT: NORMAL

## 2022-07-28 ENCOUNTER — PATIENT OUTREACH (OUTPATIENT)
Dept: OBGYN CLINIC | Facility: CLINIC | Age: 27
End: 2022-07-28

## 2022-07-28 ENCOUNTER — POSTPARTUM VISIT (OUTPATIENT)
Dept: OBGYN CLINIC | Facility: CLINIC | Age: 27
End: 2022-07-28

## 2022-07-28 VITALS
HEART RATE: 65 BPM | DIASTOLIC BLOOD PRESSURE: 80 MMHG | BODY MASS INDEX: 40.7 KG/M2 | WEIGHT: 233.4 LBS | SYSTOLIC BLOOD PRESSURE: 116 MMHG

## 2022-07-28 DIAGNOSIS — Z13.31 POSITIVE DEPRESSION SCREENING: Primary | ICD-10-CM

## 2022-07-28 PROCEDURE — 99213 OFFICE O/P EST LOW 20 MIN: CPT | Performed by: OBSTETRICS & GYNECOLOGY

## 2022-07-28 PROCEDURE — 0503F POSTPARTUM CARE VISIT: CPT | Performed by: OBSTETRICS & GYNECOLOGY

## 2022-07-28 RX ORDER — SERTRALINE HYDROCHLORIDE 25 MG/1
25 TABLET, FILM COATED ORAL DAILY
Qty: 30 TABLET | Refills: 3 | Status: SHIPPED | OUTPATIENT
Start: 2022-07-28 | End: 2022-08-27

## 2022-07-28 NOTE — PROGRESS NOTES
"Pt MSSA score this morning is a 3, no medication required for withdrawal. Pt is on suboxone maintenance and received her scheduled suboxone dose this morning. She is angry, hostile but did participate in the daily nursing assessment. She will not complete the admission assessment suicide questions getting angry when asked to answer if she has ever been or attempted suicide in her lifetime. She denies current suicidal ideation plans or intent. Endorses anxiety \"10\" of 10 in severity, prn hydroxyzine administered. Pt refusing lab draw, updated Nitza with internal medicine and labs were discontinued based on pt unwillingness to participate. She is hostile in interaction verbally but participated enough to receive her medications and get the daily assessment complete and then returned to her room.  " Subjective     Linsey Mistry is a 27 y o  y o  female  who presents for a postpartum visit  She is 3 weeks postpartum following a spontaneous vaginal delivery on 22  I have fully reviewed the prenatal and intrapartum course  The delivery was at 40 gestational weeks  Outcome: spontaneous vaginal delivery  Anesthesia: epidural  Postpartum course has been uncomplicated  Baby's course has been uncomplicated  Baby is feeding by bottle - Similac Advance  Bleeding thin lochia  Bowel function is normal  Bladder function is normal  Patient is not sexually active  Contraception method is plan to start COCP's at 6w visit  Postpartum depression screening: positive (15)  The following portions of the patient's history were reviewed and updated as appropriate: allergies, past family history, past social history and problem list     Review of Systems  Pertinent items are noted in HPI       Objective   Vitals:    22 0936   BP: 116/80   Pulse: 65       General:   appears stated age, cooperative, alert normal mood and affect   Neck: Neck: normal, supple,trachea midline, no masses   Heart: regular rate and rhythm, S1, S2 normal, no murmur, click, rub or gallop   Lungs: clear to auscultation bilaterally   Breasts: normal appearance, no masses or tenderness   Abdomen: soft, non-tender, without masses or organomegaly     Assessment/Plan      Pap smear not done at today's visit, last pap in 2022 and NILM  1  Postpartum Depression   -Pt has a history of depression and was on medication as a teenager that she discontinued as it made her sleepy  She reports feeling safe at home with support but would like to start a medication  Discussed with patient about starting Zoloft 25mg daily   Also discussed with patient about calling if she was ever feeling unstable or worried about harming her self or baby  Discussed with  Soraida and her number was given to the patient and she will reach out to the patient  Referral given to family medicine to follow up in 1-2 months regarding depression  Return to [facility] office in 3 weeks for follow up  2 Contraception: OCP (estrogen/progesterone) to start at 6 week follow up visit   3  Follow up in: 3 weeks to start birth control and follow up in regards to postpartum depression

## 2022-07-29 NOTE — PROGRESS NOTES
CAMERON RYAN was referred by Dr Kodak Winslow for a depression screen of 15, CAMERON Ryan called pt today to see how she is doing, she answered and sounded very tired, pt reports baby has nights and days messed up which is why she is currently sleeping  CAMERON Ryan agreed to call back another day and will plan to see her on 8/18 as well

## 2022-08-18 ENCOUNTER — PATIENT OUTREACH (OUTPATIENT)
Dept: OBGYN CLINIC | Facility: CLINIC | Age: 27
End: 2022-08-18

## 2022-08-18 ENCOUNTER — POSTPARTUM VISIT (OUTPATIENT)
Dept: OBGYN CLINIC | Facility: CLINIC | Age: 27
End: 2022-08-18

## 2022-08-18 VITALS
DIASTOLIC BLOOD PRESSURE: 75 MMHG | WEIGHT: 231.6 LBS | BODY MASS INDEX: 41.04 KG/M2 | HEART RATE: 68 BPM | HEIGHT: 63 IN | SYSTOLIC BLOOD PRESSURE: 118 MMHG

## 2022-08-18 PROCEDURE — 81025 URINE PREGNANCY TEST: CPT | Performed by: OBSTETRICS & GYNECOLOGY

## 2022-08-18 PROCEDURE — 0503F POSTPARTUM CARE VISIT: CPT | Performed by: OBSTETRICS & GYNECOLOGY

## 2022-08-18 PROCEDURE — 99213 OFFICE O/P EST LOW 20 MIN: CPT | Performed by: OBSTETRICS & GYNECOLOGY

## 2022-08-18 RX ORDER — NORGESTIMATE AND ETHINYL ESTRADIOL 0.25-0.035
1 KIT ORAL DAILY
Qty: 28 TABLET | Refills: 3 | Status: SHIPPED | OUTPATIENT
Start: 2022-08-18 | End: 2022-09-15

## 2022-08-18 NOTE — PROGRESS NOTES
CAMERON DAVIS met with the patient in the office today to see how she is doing post partum  [place] score was a 16  Pt denies any SI/HI  Pt reports that she has dealt with chronic depression since she was a teenager  She reports that she had a therapist in high school but does not feel like talking was helpful  Pt was prescribed zoloft by Dr Lesly Campa, pt states that she does not think she will take the zoloft because she is worried it will make her sleepy  CAMERON DAVIS offered her to be set up to see Dr Gustavo Ruiz in the office for medication mgmt, pt declines  Pt declines a referral to the [facility] or any other [place] [address] agencies at this time  CAMERON DAVIS provided her with my contact information today and enc her to reach out if she would like any services  CAMERON DAVIS will close this referral,please re-refer as needed

## 2022-08-18 NOTE — PROGRESS NOTES
Subjective     Butch Johnson is a 27 y o  y o  female Serjio Banegas who presents for a postpartum visit  She is 6 weeks postpartum following a spontaneous vaginal delivery on 7/7    I have fully reviewed the prenatal and intrapartum course  The delivery was at 40 gestational weeks  Outcome: spontaneous vaginal delivery  Anesthesia: epidural  Postpartum course has been complicated by depression  Pt thinks is mostly due to exhaustion but she has a history of depression and her overall mood and morale is low  Baby's course has been uncomplicated  Baby is feeding by bottle - Similac Advance  Bleeding staining only  Bowel function is normal  Bladder function is normal  Patient is sexually active  Contraception method is none at the moment, plan is to start OCP's  Postpartum depression screening: positive (16)  The following portions of the patient's history were reviewed and updated as appropriate: allergies, past family history, past social history and past surgical history  Review of Systems  Pertinent items are noted in HPI       Objective   Vitals:    08/18/22 0951   BP: 118/75   Pulse: 68     Assessment/Plan     Normal postpartum exam  Pap smear not done at today's visit  1  Depression  Depression Screening Follow-up Plan: Patient's depression screening was positive with a EPDS score of 16  Patient assessed for underlying major depression  They have no active suicidal ideations  Brief counseling provided and recommend additional follow-up/re-evaluation next office visit  She is being started on Zoloft 25mg which was prescribed at last visit, she had yet to  her prescription but will pick it up today  She will be seen in office by [facility]  Will plan for follow up in 6 weeks to reassess symptoms on Zoloft       2  Contraception: OCP (estrogen/progesterone); script sent to pharmacy; Urine pregnancy test was negative

## 2022-08-19 LAB — SL AMB POCT URINE HCG: NORMAL

## 2022-09-29 ENCOUNTER — TELEPHONE (OUTPATIENT)
Dept: OBGYN CLINIC | Facility: CLINIC | Age: 27
End: 2022-09-29

## 2023-05-09 ENCOUNTER — HOSPITAL ENCOUNTER (EMERGENCY)
Facility: HOSPITAL | Age: 28
Discharge: HOME | End: 2023-05-10
Attending: EMERGENCY MEDICINE

## 2023-05-09 VITALS
WEIGHT: 243 LBS | RESPIRATION RATE: 18 BRPM | DIASTOLIC BLOOD PRESSURE: 71 MMHG | HEART RATE: 88 BPM | TEMPERATURE: 98.5 F | OXYGEN SATURATION: 97 % | BODY MASS INDEX: 43.05 KG/M2 | SYSTOLIC BLOOD PRESSURE: 106 MMHG

## 2023-05-09 DIAGNOSIS — J02.0 STREP PHARYNGITIS: Primary | ICD-10-CM

## 2023-05-09 NOTE — Clinical Note
Eli Killian was seen and treated in our emergency department on 5/9/2023  Diagnosis:     Shahnaz Schultz  may return to work on return date  She may return on this date: 05/11/2023    Please excuse from work tomorrow  If you have any questions or concerns, please don't hesitate to call        Yvonne Colón DO    ______________________________           _______________          _______________  Hospital Representative                              Date                                Time

## 2023-05-09 NOTE — Clinical Note
Griselda Parish was seen and treated in our emergency department on 5/9/2023  Diagnosis:     Lori Rothman  may return to work on return date  She may return on this date: 05/11/2023    Please excuse from work tomorrow  If you have any questions or concerns, please don't hesitate to call        Francheska Phillips, DO    ______________________________           _______________          _______________  Hospital Representative                              Date                                Time

## 2023-05-10 RX ORDER — AZITHROMYCIN 250 MG/1
TABLET, FILM COATED ORAL
Qty: 6 TABLET | Refills: 0 | Status: SHIPPED | OUTPATIENT
Start: 2023-05-10 | End: 2023-05-13

## 2023-05-10 NOTE — ED PROVIDER NOTES
History  Chief Complaint   Patient presents with   • Sore Throat     Started yesterday  No fevers  68-year-old female presents with complaint of sore throat over the past day or so  Symptoms have been increasing in severity  Pain feels similar to previous episodes of strep throat  She has increased pain with swallowing but denies any difficulty breathing  No other symptoms or concerns  Sore Throat  Location:  Generalized  Quality:  Sore and aching  Severity:  Moderate  Onset quality:  Gradual  Timing:  Constant  Progression:  Worsening  Chronicity:  Recurrent  Relieved by:  Nothing  Worsened by:  Swallowing  Ineffective treatments:  None tried  Associated symptoms: no abdominal pain, no chills, no cough, no drooling, no epistaxis, no fever, no postnasal drip, no rhinorrhea, no shortness of breath, no stridor, no trouble swallowing and no voice change        Prior to Admission Medications   Prescriptions Last Dose Informant Patient Reported? Taking?    Prenatal Multivit-Min-Fe-FA (Pre- Formula) TABS   No No   Sig: Take 1 tablet by mouth daily   acetaminophen (TYLENOL) 500 mg tablet   No No   Sig: Take 1 tablet (500 mg total) by mouth every 6 (six) hours as needed (pain)   albuterol (PROVENTIL HFA,VENTOLIN HFA) 90 mcg/act inhaler   Yes No   Sig: Inhale 2 puffs every 6 (six) hours as needed for wheezing   aspirin 81 mg chewable tablet   Yes No   Sig: Chew 81 mg daily   Patient not taking: Reported on 2022   ibuprofen (MOTRIN) 400 mg tablet   No No   Sig: Take 1 tablet (400 mg total) by mouth every 6 (six) hours as needed for mild pain   montelukast (SINGULAIR) 10 mg tablet   Yes No   Sig: Take 10 mg by mouth   Patient not taking: No sig reported   norgestimate-ethinyl estradiol (Sprintec 28) 0 25-35 MG-MCG per tablet   No No   Sig: Take 1 tablet by mouth daily for 28 days   sertraline (Zoloft) 25 mg tablet   No No   Sig: Take 1 tablet (25 mg total) by mouth daily   Patient not taking: Reported on 8/18/2022      Facility-Administered Medications: None       Past Medical History:   Diagnosis Date   • Asthma    • Migraine        History reviewed  No pertinent surgical history  Family History   Problem Relation Age of Onset   • Asthma Father    • Diabetes Father      I have reviewed and agree with the history as documented  E-Cigarette/Vaping   • E-Cigarette Use Never User      E-Cigarette/Vaping Substances     Social History     Tobacco Use   • Smoking status: Some Days     Types: Cigarettes   • Smokeless tobacco: Never   Vaping Use   • Vaping Use: Never used   Substance Use Topics   • Alcohol use: No   • Drug use: Yes     Types: Marijuana       Review of Systems   Constitutional: Negative for chills and fever  HENT: Positive for sore throat  Negative for drooling, nosebleeds, postnasal drip, rhinorrhea, trouble swallowing and voice change  Respiratory: Negative for cough, shortness of breath and stridor  Gastrointestinal: Negative for abdominal pain  All other systems reviewed and are negative  Physical Exam  Physical Exam  Vitals and nursing note reviewed  Constitutional:       General: She is not in acute distress  Appearance: Normal appearance  She is well-developed  She is not ill-appearing or toxic-appearing  HENT:      Head: Normocephalic and atraumatic  Right Ear: External ear normal       Left Ear: External ear normal       Nose: Nose normal  No congestion  Mouth/Throat:      Mouth: Mucous membranes are moist       Pharynx: Oropharynx is clear  Posterior oropharyngeal erythema present  Tonsils: Tonsillar exudate present  No tonsillar abscesses  Eyes:      Conjunctiva/sclera: Conjunctivae normal       Pupils: Pupils are equal, round, and reactive to light  Cardiovascular:      Rate and Rhythm: Normal rate and regular rhythm  Heart sounds: Normal heart sounds  Pulmonary:      Effort: Pulmonary effort is normal  No respiratory distress        Breath sounds: Normal breath sounds  No wheezing  Abdominal:      General: Bowel sounds are normal       Palpations: Abdomen is soft  Tenderness: There is no abdominal tenderness  There is no guarding  Musculoskeletal:         General: No tenderness or deformity  Cervical back: Normal range of motion and neck supple  No rigidity  Skin:     General: Skin is warm and dry  Capillary Refill: Capillary refill takes less than 2 seconds  Findings: No rash  Neurological:      General: No focal deficit present  Mental Status: She is alert and oriented to person, place, and time  Psychiatric:         Mood and Affect: Mood normal          Behavior: Behavior normal          Vital Signs  ED Triage Vitals [05/09/23 2339]   Temperature Pulse Respirations Blood Pressure SpO2   98 5 °F (36 9 °C) 88 18 106/71 97 %      Temp Source Heart Rate Source Patient Position - Orthostatic VS BP Location FiO2 (%)   Tympanic Monitor Sitting Right arm --      Pain Score       6           Vitals:    05/09/23 2339   BP: 106/71   Pulse: 88   Patient Position - Orthostatic VS: Sitting         Visual Acuity      ED Medications  Medications - No data to display    Diagnostic Studies  Results Reviewed     None                 No orders to display              Procedures  Procedures         ED Course                               SBIRT 22yo+    Flowsheet Row Most Recent Value   Initial Alcohol Screen: US AUDIT-C     1  How often do you have a drink containing alcohol? 0 Filed at: 05/09/2023 2342   2  How many drinks containing alcohol do you have on a typical day you are drinking? 0 Filed at: 05/09/2023 2342   3a  Male UNDER 65: How often do you have five or more drinks on one occasion? 0 Filed at: 05/09/2023 2342   3b  FEMALE Any Age, or MALE 65+: How often do you have 4 or more drinks on one occassion? 0 Filed at: 05/09/2023 2342   Audit-C Score 0 Filed at: 05/09/2023 2342   DINOICIO: How many times in the past year have you    "   Used an illegal drug or used a prescription medication for non-medical reasons? Daily or Almost Daily Filed at: 05/09/2023 2342   DAST-10: In the past 12 months       1  Have you used drugs other than those required for medical reasons? 1 Filed at: 05/09/2023 2342   2  Do you use more than one drug at a time? 0 Filed at: 05/09/2023 2342   3  Have you had medical problems as a result of your drug use (e g , memory loss, hepatitis, convulsions, bleeding, etc )? 0 Filed at: 05/09/2023 2342   4  Have you had \"blackouts\" or \"flashbacks\" as a result of drug use? YesNo 0 Filed at: 05/09/2023 2342   5  Do you ever feel bad or guilty about your drug use? 0 Filed at: 05/09/2023 2342   6  Does your spouse (or parent) ever complain about your involvement with drugs? 0 Filed at: 05/09/2023 2342   7  Have you neglected your family because of your use of drugs? 0 Filed at: 05/09/2023 2342   8  Have you engaged in illegal activities in order to obtain drugs? 0 Filed at: 05/09/2023 2342   9  Have you ever experienced withdrawal symptoms (felt sick) when you stopped taking drugs? 0 Filed at: 05/09/2023 2342   10  Are you always able to stop using drugs when you want to? 0 Filed at: 05/09/2023 2342   DAST-10 Score 1 Filed at: 05/09/2023 2342                    Medical Decision Making  44-year-old female presents with complaint of recurrent sore throat  And exam are consistent with strep pharyngitis  Patient has a history of rylie this once a year or every other year  On exam she is nontoxic and well-hydrated  Plan to treat with antibiotics and have her follow-up as an outpatient          Disposition  Final diagnoses:   Strep pharyngitis     Time reflects when diagnosis was documented in both MDM as applicable and the Disposition within this note     Time User Action Codes Description Comment    5/9/2023 11:43 PM Marcus Juarez Add [J02 0] Strep pharyngitis       ED Disposition     ED Disposition   Discharge    Condition " Stable    Date/Time   Tue May 9, 2023 11:43 PM    Comment   Fariha Simth discharge to home/self care  Follow-up Information     Follow up With Specialties Details Why [address] [facility]  [address] [facility]  Tevin Mina   [address]  [facility] [address]             Patient's Medications   Discharge Prescriptions    AZITHROMYCIN (ZITHROMAX) 250 MG TABLET    Take 2 tablets today then 1 tablet daily x 4 days       Start Date: 5/10/2023 End Date: 5/13/2023       Order Dose: --       Quantity: 6 tablet    Refills: 0    MENTHOL-CETYLPYRIDINIUM (CEPACOL) 3 MG LOZENGE    Take 1 lozenge (3 mg total) by mouth as needed for sore throat       Start Date: 5/10/2023 End Date: --       Order Dose: 3 mg       Quantity: 9 lozenge    Refills: 0       No discharge procedures on file      PDMP Review     None          ED Provider  Electronically Signed by           Sarahy Meneses DO  05/10/23 0001

## 2023-08-30 ENCOUNTER — HOSPITAL ENCOUNTER (EMERGENCY)
Facility: HOSPITAL | Age: 28
Discharge: HOME | End: 2023-08-30
Attending: EMERGENCY MEDICINE | Admitting: EMERGENCY MEDICINE
Payer: COMMERCIAL

## 2023-08-30 VITALS
RESPIRATION RATE: 18 BRPM | TEMPERATURE: 98.8 F | SYSTOLIC BLOOD PRESSURE: 124 MMHG | DIASTOLIC BLOOD PRESSURE: 77 MMHG | HEART RATE: 92 BPM | OXYGEN SATURATION: 98 %

## 2023-08-30 DIAGNOSIS — J02.9 ACUTE PHARYNGITIS: Primary | ICD-10-CM

## 2023-08-30 LAB
FLUAV RNA RESP QL NAA+PROBE: NEGATIVE
FLUBV RNA RESP QL NAA+PROBE: NEGATIVE
RSV RNA RESP QL NAA+PROBE: NEGATIVE
S PYO DNA THROAT QL NAA+PROBE: NOT DETECTED
SARS-COV-2 RNA RESP QL NAA+PROBE: NEGATIVE

## 2023-08-30 PROCEDURE — 99283 EMERGENCY DEPT VISIT LOW MDM: CPT

## 2023-08-30 PROCEDURE — 87651 STREP A DNA AMP PROBE: CPT

## 2023-08-30 PROCEDURE — 0241U HB NFCT DS VIR RESP RNA 4 TRGT: CPT

## 2023-08-30 PROCEDURE — 99284 EMERGENCY DEPT VISIT MOD MDM: CPT

## 2023-08-30 RX ORDER — IBUPROFEN 600 MG/1
600 TABLET ORAL EVERY 6 HOURS PRN
Qty: 30 TABLET | Refills: 0 | Status: SHIPPED | OUTPATIENT
Start: 2023-08-30

## 2023-08-30 NOTE — DISCHARGE INSTRUCTIONS
Follow up with PCP as needed. Take tylenol and/or ibuprofen as needed for throat, fever and myalgias. If symptoms persist or worsen seek medical attention or come back to the ER.

## 2023-08-30 NOTE — ED PROVIDER NOTES
History  Chief Complaint   Patient presents with   • Sore Throat     States sore throat since Saturday. Denies N/V/D. This is a 46-year-old female with PMHx of asthma and ADHD who presents for 4 days of sore throat, fever and chills. The patient reports having strep pharyngitis recently and not finishing her medication course. The patient states that her symptoms started  and have been about the same. The patient states that her throat is currently a 5/10 pain and hurts to swallow. Patient reports taking tylenol/motrin as needed. The patient reports cough, fatigue, nasal discharge, chills, fever, and night sweats. Patient denies SOB, chest pain, hearing or voice changes, ear discharge. Prior to Admission Medications   Prescriptions Last Dose Informant Patient Reported? Taking? Prenatal Multivit-Min-Fe-FA (Pre-Dania Formula) TABS   No No   Sig: Take 1 tablet by mouth daily   acetaminophen (TYLENOL) 500 mg tablet   No No   Sig: Take 1 tablet (500 mg total) by mouth every 6 (six) hours as needed (pain)   albuterol (PROVENTIL HFA,VENTOLIN HFA) 90 mcg/act inhaler   Yes No   Sig: Inhale 2 puffs every 6 (six) hours as needed for wheezing   aspirin 81 mg chewable tablet   Yes No   Sig: Chew 81 mg daily   Patient not taking: Reported on 2022   menthol-cetylpyridinium (CEPACOL) 3 MG lozenge   No No   Sig: Take 1 lozenge (3 mg total) by mouth as needed for sore throat   montelukast (SINGULAIR) 10 mg tablet   Yes No   Sig: Take 10 mg by mouth   Patient not taking: No sig reported   norgestimate-ethinyl estradiol (Sprintec 28) 0.25-35 MG-MCG per tablet   No No   Sig: Take 1 tablet by mouth daily for 28 days   sertraline (Zoloft) 25 mg tablet   No No   Sig: Take 1 tablet (25 mg total) by mouth daily   Patient not taking: Reported on 2022      Facility-Administered Medications: None       Past Medical History:   Diagnosis Date   • Asthma    • Migraine        History reviewed.  No pertinent surgical history. Family History   Problem Relation Age of Onset   • Asthma Father    • Diabetes Father      I have reviewed and agree with the history as documented. E-Cigarette/Vaping   • E-Cigarette Use Never User      E-Cigarette/Vaping Substances     Social History     Tobacco Use   • Smoking status: Some Days     Types: Cigarettes   • Smokeless tobacco: Never   Vaping Use   • Vaping Use: Never used   Substance Use Topics   • Alcohol use: No   • Drug use: Yes     Types: Marijuana       Review of Systems   Constitutional: Positive for chills, fatigue and fever. HENT: Positive for congestion, rhinorrhea and sore throat. Negative for ear discharge, ear pain, hearing loss, mouth sores, nosebleeds, sinus pressure, tinnitus and voice change. Eyes: Negative. Negative for pain and visual disturbance. Respiratory: Positive for cough. Negative for chest tightness, shortness of breath and wheezing. Cardiovascular: Negative for chest pain and palpitations. Gastrointestinal: Negative. Negative for abdominal pain and vomiting. Genitourinary: Negative for dysuria and hematuria. Musculoskeletal: Positive for myalgias. Negative for arthralgias and back pain. Skin: Negative for color change and rash. Neurological: Negative for seizures and syncope. All other systems reviewed and are negative. Physical Exam  Physical Exam  Vitals and nursing note reviewed. Constitutional:       General: She is not in acute distress. Appearance: She is well-developed. HENT:      Head: Normocephalic and atraumatic. Right Ear: Hearing normal.      Ears:      Comments: Left ear erythematous with large cerumen in both ears     Mouth/Throat:      Lips: Pink. Mouth: Mucous membranes are moist.      Pharynx: Uvula midline. Posterior oropharyngeal erythema present. Tonsils: Tonsillar exudate present. 2+ on the right. 1+ on the left.    Eyes:      Conjunctiva/sclera: Conjunctivae normal. Cardiovascular:      Rate and Rhythm: Normal rate and regular rhythm. Heart sounds: No murmur heard. Pulmonary:      Effort: Pulmonary effort is normal. No respiratory distress. Breath sounds: Normal breath sounds. Abdominal:      Palpations: Abdomen is soft. Tenderness: There is no abdominal tenderness. Musculoskeletal:         General: No swelling. Cervical back: Neck supple. Skin:     General: Skin is warm and dry. Capillary Refill: Capillary refill takes less than 2 seconds. Neurological:      Mental Status: She is alert. Psychiatric:         Mood and Affect: Mood normal.         Vital Signs  ED Triage Vitals [08/30/23 1724]   Temperature Pulse Respirations Blood Pressure SpO2   98.8 °F (37.1 °C) 92 18 124/77 98 %      Temp Source Heart Rate Source Patient Position - Orthostatic VS BP Location FiO2 (%)   Oral Monitor Sitting Right arm --      Pain Score       --           Vitals:    08/30/23 1724   BP: 124/77   Pulse: 92   Patient Position - Orthostatic VS: Sitting         Visual Acuity      ED Medications  Medications - No data to display    Diagnostic Studies  Results Reviewed     Procedure Component Value Units Date/Time    FLU/RSV/COVID - if FLU/RSV clinically relevant [[id number]]  (Normal) Collected: 08/30/23 1803    Lab Status: Final result Specimen: Nares from Nasopharyngeal Swab Updated: 08/30/23 1924     SARS-CoV-2 Negative     INFLUENZA A PCR Negative     INFLUENZA B PCR Negative     RSV PCR Negative    Narrative:      FOR PEDIATRIC PATIENTS - copy/paste COVID Guidelines URL to browser: [email or web address]    SARS-CoV-2 assay is a Nucleic Acid Amplification assay intended for the  qualitative detection of nucleic acid from SARS-CoV-2 in nasopharyngeal  swabs. Results are for the presumptive identification of SARS-CoV-2 RNA.     Positive results are indicative of infection with SARS-CoV-2, the virus  causing COVID-19, but do not rule out bacterial infection or co-infection  with other viruses. Laboratories within the [facility] and its  territories are required to report all positive results to the appropriate  public health authorities. Negative results do not preclude SARS-CoV-2  infection and should not be used as the sole basis for treatment or other  patient management decisions. Negative results must be combined with  clinical observations, patient history, and epidemiological information. This test has not been FDA cleared or approved. This test has been authorized by FDA under an Emergency Use Authorization  (EUA). This test is only authorized for the duration of time the  declaration that circumstances exist justifying the authorization of the  emergency use of an in vitro diagnostic tests for detection of SARS-CoV-2  virus and/or diagnosis of COVID-19 infection under section 564(b)(1) of  the Act, 21 U. S.C. 126IYC-9(F)(7), unless the authorization is terminated  or revoked sooner. The test has been validated but independent review by FDA  and CLIA is pending. Test performed using [employer]: This RT-PCR assay targets N2,  a region unique to SARS-CoV-2. A conserved region in the E-gene was chosen  for pan-Sarbecovirus detection which includes SARS-CoV-2. According to CMS-2020-01-R, this platform meets the definition of high-throughput technology. Strep A PCR [[id number]]  (Normal) Collected: 08/30/23 1803    Lab Status: Final result Specimen: Throat Updated: 08/30/23 1913     STREP A PCR Not Detected                 No orders to display              Procedures  Procedures         ED Course  ED Course as of 08/30/23 1927   Wed Aug 30, 2023   1921 Strep negative.  Likely viral pharyngitis, f/u with PCP                                             Medical Decision Making  Patient reports with flu-like symptoms and reports recently having strep pharyngitis and not finishing her medication course. Patient reports not being vaccinated against covid. Obtain strep test and covid/flu/rsv as her child is also sick. Amount and/or Complexity of Data Reviewed  Labs: ordered. Risk  Prescription drug management. Disposition  Final diagnoses:   Acute pharyngitis     Time reflects when diagnosis was documented in both MDM as applicable and the Disposition within this note     Time User Action Codes Description Comment    8/30/2023  6:49 PM Walter Wilkins Add [J02.9] Acute pharyngitis       ED Disposition     ED Disposition   Discharge    Condition   Stable    Date/Time   Wed Aug 30, 2023  6:50 PM    Comment   Ander Ram discharge to home/self care. Follow-up Information     Follow up With Specialties Details Why [id number] [facility]  As needed [address]  [phone]            Patient's Medications   Discharge Prescriptions    IBUPROFEN (MOTRIN) 600 MG TABLET    Take 1 tablet (600 mg total) by mouth every 6 (six) hours as needed for mild pain or fever       Start Date: 8/30/2023 End Date: --       Order Dose: 600 mg       Quantity: 30 tablet    Refills: 0       No discharge procedures on file.     PDMP Review     None          ED Provider  Electronically Signed by           Walter Wilkins PA-C  08/30/23 6163

## 2023-08-30 NOTE — Clinical Note
Jeff Curran was seen and treated in our emergency department on 8/30/2023. Diagnosis:     Isma Francisco  is off the rest of the shift today. She may return on this date: If you have any questions or concerns, please don't hesitate to call.       José Miguel Howell PA-C    ______________________________           _______________          _______________  Hospital Representative                              Date                                Time

## 2023-09-20 ENCOUNTER — HOSPITAL ENCOUNTER (EMERGENCY)
Facility: HOSPITAL | Age: 28
Discharge: HOME | End: 2023-09-20
Attending: EMERGENCY MEDICINE

## 2023-09-20 ENCOUNTER — APPOINTMENT (EMERGENCY)
Dept: RADIOLOGY | Facility: HOSPITAL | Age: 28
End: 2023-09-20

## 2023-09-20 VITALS
BODY MASS INDEX: 42.72 KG/M2 | OXYGEN SATURATION: 96 % | DIASTOLIC BLOOD PRESSURE: 65 MMHG | RESPIRATION RATE: 16 BRPM | SYSTOLIC BLOOD PRESSURE: 109 MMHG | WEIGHT: 241.18 LBS | HEART RATE: 84 BPM | TEMPERATURE: 98.3 F

## 2023-09-20 DIAGNOSIS — R07.89 ATYPICAL CHEST PAIN: Primary | ICD-10-CM

## 2023-09-20 DIAGNOSIS — J06.9 VIRAL URI WITH COUGH: ICD-10-CM

## 2023-09-20 DIAGNOSIS — J45.909 ASTHMA: ICD-10-CM

## 2023-09-20 LAB — S PYO DNA THROAT QL NAA+PROBE: NOT DETECTED

## 2023-09-20 PROCEDURE — 87651 STREP A DNA AMP PROBE: CPT | Performed by: EMERGENCY MEDICINE

## 2023-09-20 PROCEDURE — 99284 EMERGENCY DEPT VISIT MOD MDM: CPT | Performed by: EMERGENCY MEDICINE

## 2023-09-20 PROCEDURE — 99285 EMERGENCY DEPT VISIT HI MDM: CPT

## 2023-09-20 PROCEDURE — 71046 X-RAY EXAM CHEST 2 VIEWS: CPT

## 2023-09-20 RX ORDER — ALBUTEROL SULFATE 90 UG/1
2 AEROSOL, METERED RESPIRATORY (INHALATION) EVERY 6 HOURS PRN
Qty: 6.7 G | Refills: 0 | Status: SHIPPED | OUTPATIENT
Start: 2023-09-20

## 2023-09-20 NOTE — ED PROVIDER NOTES
History  Chief Complaint   Patient presents with   • Chest Pain     Patient c/o sore throat that began yesterday. Patient also c/o chest pain that lasted for 15 minutes and radiated down left arm. No chest pain currently. Denies SOB or leg swelling. History provided by:  Patient  Flu Symptoms  Presenting symptoms: cough, rhinorrhea and sore throat    Presenting symptoms: no diarrhea, no fatigue, no fever, no headaches, no myalgias, no nausea, no shortness of breath and no vomiting    Presenting symptoms comment:  L sided chest pain with coughing    Cough:     Cough characteristics:  Dry    Severity:  Moderate    Duration:  2 days    Timing:  Intermittent    Progression:  Unchanged    Chronicity:  New  Severity:  Moderate  Onset quality:  Gradual  Duration:  2 days  Progression:  Worsening  Chronicity:  New  Relieved by:  Nothing  Worsened by:  Nothing  Ineffective treatments:  None tried  Associated symptoms: no chills, no decreased appetite, no decrease in physical activity, no ear pain, no mental status change, no congestion, no neck stiffness and no syncope    Risk factors: sick contacts        Prior to Admission Medications   Prescriptions Last Dose Informant Patient Reported? Taking?    Prenatal Multivit-Min-Fe-FA (Pre-Dania Formula) TABS   No No   Sig: Take 1 tablet by mouth daily   acetaminophen (TYLENOL) 500 mg tablet Not Taking  No No   Sig: Take 1 tablet (500 mg total) by mouth every 6 (six) hours as needed (pain)   Patient not taking: Reported on 2023   albuterol (PROVENTIL HFA,VENTOLIN HFA) 90 mcg/act inhaler Not Taking  Yes No   Sig: Inhale 2 puffs every 6 (six) hours as needed for wheezing   Patient not taking: Reported on 2023   aspirin 81 mg chewable tablet   Yes No   Sig: Chew 81 mg daily   Patient not taking: Reported on 2022   ibuprofen (MOTRIN) 600 mg tablet Not Taking  No No   Sig: Take 1 tablet (600 mg total) by mouth every 6 (six) hours as needed for mild pain or fever Patient not taking: Reported on 9/20/2023   menthol-cetylpyridinium (CEPACOL) 3 MG lozenge Not Taking  No No   Sig: Take 1 lozenge (3 mg total) by mouth as needed for sore throat   Patient not taking: Reported on 9/20/2023   montelukast (SINGULAIR) 10 mg tablet   Yes No   Sig: Take 10 mg by mouth   Patient not taking: No sig reported   norgestimate-ethinyl estradiol (Sprintec 28) 0.25-35 MG-MCG per tablet   No No   Sig: Take 1 tablet by mouth daily for 28 days   sertraline (Zoloft) 25 mg tablet   No No   Sig: Take 1 tablet (25 mg total) by mouth daily   Patient not taking: Reported on 8/18/2022      Facility-Administered Medications: None       Past Medical History:   Diagnosis Date   • Asthma    • Migraine        History reviewed. No pertinent surgical history. Family History   Problem Relation Age of Onset   • Asthma Father    • Diabetes Father      I have reviewed and agree with the history as documented. E-Cigarette/Vaping   • E-Cigarette Use Never User      E-Cigarette/Vaping Substances     Social History     Tobacco Use   • Smoking status: Former     Types: Cigarettes   • Smokeless tobacco: Never   Vaping Use   • Vaping Use: Never used   Substance Use Topics   • Alcohol use: No   • Drug use: Yes     Types: Marijuana       Review of Systems   Constitutional: Negative for activity change, appetite change, chills, decreased appetite, fatigue and fever. HENT: Positive for rhinorrhea and sore throat. Negative for congestion, dental problem, ear pain and voice change. Eyes: Negative for pain and redness. Respiratory: Positive for cough. Negative for chest tightness, shortness of breath and wheezing. Gastrointestinal: Negative for blood in stool, constipation, diarrhea, nausea and vomiting. Endocrine: Negative for cold intolerance and heat intolerance. Genitourinary: Negative for dysuria, frequency and hematuria. Musculoskeletal: Negative for arthralgias, myalgias and neck stiffness.    Skin: Negative for color change, pallor and rash. Neurological: Negative for headaches. Hematological: Does not bruise/bleed easily. Psychiatric/Behavioral: Negative for agitation, hallucinations and suicidal ideas. Physical Exam  Physical Exam  Constitutional:       Appearance: She is well-developed. HENT:      Head: Normocephalic and atraumatic. Eyes:      Extraocular Movements: Extraocular movements intact. Pupils: Pupils are equal, round, and reactive to light. Neck:      Vascular: No JVD. Trachea: No tracheal deviation. Cardiovascular:      Rate and Rhythm: Normal rate and regular rhythm. Pulmonary:      Effort: No tachypnea, accessory muscle usage or respiratory distress. Breath sounds: Normal breath sounds. Chest:      Chest wall: Tenderness present. Abdominal:      General: There is no distension. Musculoskeletal:      Right lower leg: Normal. No tenderness. No edema. Left lower leg: Normal. No tenderness. No edema. Skin:     General: Skin is warm. Capillary Refill: Capillary refill takes less than 2 seconds. Coloration: Skin is not cyanotic. Findings: No ecchymosis or rash. Neurological:      General: No focal deficit present. Mental Status: She is alert and oriented to person, place, and time.    Psychiatric:         Behavior: Behavior normal.         Vital Signs  ED Triage Vitals [09/20/23 1735]   Temperature Pulse Respirations Blood Pressure SpO2   98.3 °F (36.8 °C) 100 18 119/77 99 %      Temp Source Heart Rate Source Patient Position - Orthostatic VS BP Location FiO2 (%)   Oral Monitor Sitting Right arm --      Pain Score       --           Vitals:    09/20/23 1735 09/20/23 1846   BP: 119/77    Pulse: 100 55   Patient Position - Orthostatic VS: Sitting Lying         Visual Acuity      ED Medications  Medications - No data to display    Diagnostic Studies  Results Reviewed     Procedure Component Value Units Date/Time    Strep A PCR [[id number]]  (Normal) Collected: 09/20/23 1805    Lab Status: Final result Specimen: Throat Updated: 09/20/23 1847     STREP A PCR Not Detected                 XR chest 2 views   ED Interpretation by Yoel Atkins MD (09/20 1857)   Primary reviewed: no acute abnormality                 Procedures  Procedures         ED Course                               SBIRT 22yo+    Flowsheet Row Most Recent Value   Initial Alcohol Screen: US AUDIT-C     1. How often do you have a drink containing alcohol? 0 Filed at: 09/20/2023 1805   2. How many drinks containing alcohol do you have on a typical day you are drinking? 0 Filed at: 09/20/2023 1805   3a. Male UNDER 65: How often do you have five or more drinks on one occasion? 0 Filed at: 09/20/2023 1805   3b. FEMALE Any Age, or MALE 65+: How often do you have 4 or more drinks on one occassion? 0 Filed at: 09/20/2023 1805   Audit-C Score 0 Filed at: 09/20/2023 1805   DIONICIO: How many times in the past year have you. .. Used an illegal drug or used a prescription medication for non-medical reasons? Never Filed at: 09/20/2023 1805                    Medical Decision Making  Left-sided chest pain in the setting of URI symptoms-we will do rapid strep swab drops in throat, chest x-ray pneumonia, pneumothorax, treat symptoms, reassess. Amount and/or Complexity of Data Reviewed  Labs: ordered. Radiology: ordered.           Disposition  Final diagnoses:   Atypical chest pain   Viral URI with cough   Asthma - history of, no acute exacerbation     Time reflects when diagnosis was documented in both MDM as applicable and the Disposition within this note     Time User Action Codes Description Comment    9/20/2023  6:57 PM Yoni Mendez Add [R07.89] Atypical chest pain     9/20/2023  6:57 PM Coy OLMSTEAD Add [J06.9] Viral URI with cough     9/20/2023  6:57 PM Yoni Mendez Add [F19.194] Asthma     9/20/2023  6:58 PM Miladis Lee Modify [J45.909] Asthma history of, no acute exacerbation      ED Disposition     ED Disposition   Discharge    Condition   Stable    Date/Time   Wed Sep 20, 2023  6:57 PM    Comment   Shama Valencia discharge to home/self care. Follow-up Information     Follow up With Specialties Details Why Contact Info    Nolan Ansari PA-C Family Medicine Schedule an appointment as soon as possible for a visit in 2 days  [address]  [facility]  [address]            Patient's Medications   Discharge Prescriptions    ALBUTEROL (PROVENTIL HFA) 90 MCG/ACT INHALER    Inhale 2 puffs every 6 (six) hours as needed for wheezing       Start Date: 9/20/2023 End Date: --       Order Dose: 2 puffs       Quantity: 6.7 g    Refills: 0       No discharge procedures on file.     PDMP Review     None          ED Provider  Electronically Signed by           Russ Murphy MD  09/20/23 4951

## 2023-09-20 NOTE — Clinical Note
Morelia Slaughter was seen and treated in our emergency department on 9/20/2023. No restrictions            Diagnosis:     Dwain Landry  may return to work on return date. She may return on this date: 09/22/2023         If you have any questions or concerns, please don't hesitate to call.       Delaney Kraus MD    ______________________________           _______________          _______________  Hospital Representative                              Date                                Time

## 2024-07-10 ENCOUNTER — HOSPITAL ENCOUNTER (EMERGENCY)
Facility: HOSPITAL | Age: 29
Discharge: HOME | End: 2024-07-10
Attending: EMERGENCY MEDICINE
Payer: COMMERCIAL

## 2024-07-10 VITALS
OXYGEN SATURATION: 96 % | HEART RATE: 83 BPM | WEIGHT: 237.4 LBS | RESPIRATION RATE: 16 BRPM | SYSTOLIC BLOOD PRESSURE: 115 MMHG | BODY MASS INDEX: 42.05 KG/M2 | DIASTOLIC BLOOD PRESSURE: 59 MMHG | TEMPERATURE: 98 F

## 2024-07-10 DIAGNOSIS — J02.9 PHARYNGITIS, UNSPECIFIED ETIOLOGY: Primary | ICD-10-CM

## 2024-07-10 DIAGNOSIS — J06.9 VIRAL URI WITH COUGH: ICD-10-CM

## 2024-07-10 LAB
EXT PREGNANCY TEST URINE: NEGATIVE
EXT. CONTROL: NORMAL
S PYO DNA THROAT QL NAA+PROBE: NOT DETECTED

## 2024-07-10 PROCEDURE — 99284 EMERGENCY DEPT VISIT MOD MDM: CPT | Performed by: EMERGENCY MEDICINE

## 2024-07-10 PROCEDURE — 87636 SARSCOV2 & INF A&B AMP PRB: CPT | Performed by: EMERGENCY MEDICINE

## 2024-07-10 PROCEDURE — 87651 STREP A DNA AMP PROBE: CPT | Performed by: EMERGENCY MEDICINE

## 2024-07-10 PROCEDURE — 81025 URINE PREGNANCY TEST: CPT | Performed by: EMERGENCY MEDICINE

## 2024-07-10 RX ORDER — ALBUTEROL SULFATE 90 UG/1
2 AEROSOL, METERED RESPIRATORY (INHALATION) EVERY 4 HOURS PRN
Qty: 8.5 G | Refills: 0 | Status: SHIPPED | OUTPATIENT
Start: 2024-07-10

## 2024-07-10 NOTE — Clinical Note
Julio Al was seen and treated in our emergency department on 7/10/2024.                Diagnosis:     Julio  .    She may return on this date: 07/12/2024         If you have any questions or concerns, please don't hesitate to call.      Chato Hammond MD    ______________________________           _______________          _______________  Hospital Representative                              Date                                Time

## 2024-07-10 NOTE — ED PROVIDER NOTES
History  Chief Complaint   Patient presents with    Sore Throat     Swollen tonsils for a few days - no meds pta      Patient with 3 days of sore throat she says on the first day she had a little bit of vomiting diarrhea that stopped she has a minimal cough she denies any fever chest pain or shortness of breath or rash      Sore Throat  Associated symptoms: cough    Associated symptoms: no abdominal pain, no chest pain, no chills, no ear pain, no fever, no rash and no shortness of breath        Prior to Admission Medications   Prescriptions Last Dose Informant Patient Reported? Taking?   Prenatal Multivit-Min-Fe-FA (Pre- Formula) TABS   No No   Sig: Take 1 tablet by mouth daily   acetaminophen (TYLENOL) 500 mg tablet   No No   Sig: Take 1 tablet (500 mg total) by mouth every 6 (six) hours as needed (pain)   Patient not taking: Reported on 2023   albuterol (PROVENTIL HFA,VENTOLIN HFA) 90 mcg/act inhaler   Yes No   Sig: Inhale 2 puffs every 6 (six) hours as needed for wheezing   Patient not taking: Reported on 2023   albuterol (Proventil HFA) 90 mcg/act inhaler   No No   Sig: Inhale 2 puffs every 6 (six) hours as needed for wheezing   aspirin 81 mg chewable tablet   Yes No   Sig: Chew 81 mg daily   Patient not taking: Reported on 2022   ibuprofen (MOTRIN) 600 mg tablet   No No   Sig: Take 1 tablet (600 mg total) by mouth every 6 (six) hours as needed for mild pain or fever   Patient not taking: Reported on 2023   menthol-cetylpyridinium (CEPACOL) 3 MG lozenge   No No   Sig: Take 1 lozenge (3 mg total) by mouth as needed for sore throat   Patient not taking: Reported on 2023   montelukast (SINGULAIR) 10 mg tablet   Yes No   Sig: Take 10 mg by mouth   Patient not taking: No sig reported   norgestimate-ethinyl estradiol (Sprintec 28) 0.25-35 MG-MCG per tablet   No No   Sig: Take 1 tablet by mouth daily for 28 days   sertraline (Zoloft) 25 mg tablet   No No   Sig: Take 1 tablet (25 mg total)  by mouth daily   Patient not taking: Reported on 8/18/2022      Facility-Administered Medications: None       Past Medical History:   Diagnosis Date    Asthma     Migraine        History reviewed. No pertinent surgical history.    Family History   Problem Relation Age of Onset    Asthma Father     Diabetes Father      I have reviewed and agree with the history as documented.    E-Cigarette/Vaping    E-Cigarette Use Never User      E-Cigarette/Vaping Substances     Social History     Tobacco Use    Smoking status: Former     Types: Cigarettes    Smokeless tobacco: Never   Vaping Use    Vaping status: Never Used   Substance Use Topics    Alcohol use: No    Drug use: Yes     Types: Marijuana       Review of Systems   Constitutional:  Negative for chills and fever.   HENT:  Positive for sore throat. Negative for ear pain.    Eyes:  Negative for pain, redness and visual disturbance.   Respiratory:  Positive for cough. Negative for shortness of breath.    Cardiovascular:  Negative for chest pain and palpitations.   Gastrointestinal:  Negative for abdominal pain.   Musculoskeletal:  Negative for back pain and neck pain.   Skin:  Negative for color change and rash.   Neurological:  Negative for seizures and syncope.   All other systems reviewed and are negative.      Physical Exam  Physical Exam  Vitals and nursing note reviewed.   Constitutional:       General: She is not in acute distress.     Appearance: She is well-developed.   HENT:      Head: Normocephalic and atraumatic.      Mouth/Throat:      Mouth: Mucous membranes are moist.      Tonsils: No tonsillar exudate or tonsillar abscesses.      Comments: Large tonsils not kissing uvula midline no exudate no dysphonia no trismus no peritonsillar abscess  Eyes:      Conjunctiva/sclera: Conjunctivae normal.   Cardiovascular:      Rate and Rhythm: Normal rate and regular rhythm.      Heart sounds: No murmur heard.  Pulmonary:      Effort: Pulmonary effort is normal. No  respiratory distress.      Breath sounds: Normal breath sounds.   Abdominal:      Palpations: Abdomen is soft.      Tenderness: There is no abdominal tenderness.   Musculoskeletal:         General: No swelling.      Cervical back: Normal range of motion and neck supple.   Lymphadenopathy:      Cervical: No cervical adenopathy.   Skin:     General: Skin is warm and dry.      Capillary Refill: Capillary refill takes less than 2 seconds.   Neurological:      General: No focal deficit present.      Mental Status: She is alert.   Psychiatric:         Mood and Affect: Mood normal.         Vital Signs  ED Triage Vitals [07/10/24 1124]   Temperature Pulse Respirations Blood Pressure SpO2   98 °F (36.7 °C) 83 16 115/59 96 %      Temp Source Heart Rate Source Patient Position - Orthostatic VS BP Location FiO2 (%)   Oral Monitor Sitting Left arm --      Pain Score       --           Vitals:    07/10/24 1124   BP: 115/59   Pulse: 83   Patient Position - Orthostatic VS: Sitting         Visual Acuity      ED Medications  Medications - No data to display    Diagnostic Studies  Results Reviewed       Procedure Component Value Units Date/Time    POCT pregnancy, urine [[id number]]  (Normal) Resulted: 07/10/24 1134    Lab Status: Final result Updated: 07/10/24 1134     EXT Preg Test, Ur Negative     Control Valid    Strep A PCR [[id number]] Collected: 07/10/24 1125    Lab Status: In process Specimen: Throat Updated: 07/10/24 1129    FLU/COVID - if FLU clinically relevant [[id number]] Collected: 07/10/24 1125    Lab Status: In process Specimen: Nares from Nose Updated: 07/10/24 1128                   No orders to display              Procedures  Procedures         ED Course                                               Medical Decision Making  Viral illness patient no respiratory distress no airway compromise strep flu and COVID pending patient has 0 out of 4 Centor's criteria for strep clinically no peritonsillar abscess or  retropharyngeal abscess regnancy test negative    Amount and/or Complexity of Data Reviewed  Labs: ordered.                 Disposition  Final diagnoses:   Pharyngitis, unspecified etiology   Viral URI with cough     Time reflects when diagnosis was documented in both MDM as applicable and the Disposition within this note       Time User Action Codes Description Comment    7/10/2024 11:35 AM Chato Hammond [J02.9] Pharyngitis, unspecified etiology     7/10/2024 11:35 AM Chato Hammond [J06.9] Viral URI with cough           ED Disposition       ED Disposition   Discharge    Condition   Stable    Date/Time   Wed Jul 10, 2024 11:35 AM    Comment   Julio Al discharge to home/self care.                   Follow-up Information       Follow up With Specialties Details Why Contact Info    Kianna Child PA-C Family Medicine In 3 days  [address] [place] [id number]  [phone]              Patient's Medications   Discharge Prescriptions    No medications on file       No discharge procedures on file.    PDMP Review       None            ED Provider  Electronically Signed by             Chato Hammond MD  07/10/24 4818

## 2024-07-11 LAB
FLUAV RNA RESP QL NAA+PROBE: NEGATIVE
FLUBV RNA RESP QL NAA+PROBE: NEGATIVE
SARS-COV-2 RNA RESP QL NAA+PROBE: NEGATIVE

## 2024-09-16 ENCOUNTER — HOSPITAL ENCOUNTER (EMERGENCY)
Facility: HOSPITAL | Age: 29
Discharge: HOME | End: 2024-09-16
Attending: EMERGENCY MEDICINE | Admitting: EMERGENCY MEDICINE
Payer: COMMERCIAL

## 2024-09-16 VITALS
OXYGEN SATURATION: 99 % | HEART RATE: 86 BPM | WEIGHT: 246.91 LBS | TEMPERATURE: 98.3 F | DIASTOLIC BLOOD PRESSURE: 64 MMHG | BODY MASS INDEX: 43.74 KG/M2 | RESPIRATION RATE: 18 BRPM | SYSTOLIC BLOOD PRESSURE: 107 MMHG

## 2024-09-16 DIAGNOSIS — F12.10 MARIJUANA ABUSE: Primary | ICD-10-CM

## 2024-09-16 PROCEDURE — 99282 EMERGENCY DEPT VISIT SF MDM: CPT

## 2024-09-16 PROCEDURE — 99283 EMERGENCY DEPT VISIT LOW MDM: CPT | Performed by: EMERGENCY MEDICINE

## 2024-09-16 NOTE — ED ATTENDING ATTESTATION
9/16/2024  IAmarjit Jr, DO, saw and evaluated the patient. I have discussed the patient with the resident/non-physician practitioner and agree with the resident's/non-physician practitioner's findings, Plan of Care, and MDM as documented in the resident's/non-physician practitioner's note, except where noted. All available labs and Radiology studies were reviewed.  I was present for key portions of any procedure(s) performed by the resident/non-physician practitioner and I was immediately available to provide assistance.       At this point I agree with the current assessment done in the Emergency Department.  I have conducted an independent evaluation of this patient a history and physical is as follows:    Final Diagnosis:  1. Marijuana abuse            MDM     Assessment and Plan    ASSESSMENT:  Headache from smoking marijuana.  Patient is well appearing and neurologically intact. Headache was not acute or maximal in onset. Do not suspect SAH, temporal arteritis, meningitis, encephalitis, CO poisoning, acute angle closure glaucoma, dural venous sinus thrombosis as cause of headache. Do not feel that further imaging or workup (including LP) are warranted at this time.         PLAN:  - Pt resting comfortably.  Will continue with observation.  No treatment thus far.    -No decompensation with observation here in the emergency department.  Family came to  the patient.  Patient was able to ambulate without ataxia.  Discharged to their care.  - Return precautions reviewed orally for concerning red flags  - Patient to follow-up with PCP or return for worsening.       See orders for this visit as documented in the electronic medical record.        Lab Results:   Abnormal Labs Reviewed - No data to display  Lab Results: I have personally reviewed pertinent lab results.    Imaging:   No orders to display     I have personally reviewed pertinent reports.    EKG, Pathology, and Other Studies: I have  "personally reviewed pertinent films in PACS    Clinical Impression:    Final diagnoses:   Marijuana abuse         Disposition    discharged           New Prescriptions:    Discharge Medication List as of 9/16/2024  1:46 AM               Follow-up Instructions:    No follow-up provider specified.        History of Present Illness   Julio Al is a 28 y.o. female who presents with Headache (Patient brought in by EMS after smoking a \"dab\" bryant blunt about 40 minutes ago. Patient is c/o of leg weakness and headache. )    has a past medical history of Asthma and Migraine..         Objective     Vitals:    09/16/24 0049   BP: 107/64   BP Location: Left arm   Pulse: 86   Resp: 18   Temp: 98.3 °F (36.8 °C)   TempSrc: Oral   SpO2: 99%   Weight: 112 kg (246 lb 14.6 oz)     Body mass index is 43.74 kg/m².  No intake or output data in the 24 hours ending 09/16/24 0224  Invasive Devices       None                   ED Course         Critical Care Time  Procedures      "

## 2024-09-16 NOTE — ED PROVIDER NOTES
"1. Marijuana abuse      ED Disposition       ED Disposition   Discharge    Condition   Stable    Date/Time   Mon Sep 16, 2024  1:14 AM    Comment   Julio Al discharge to home/self care.                   Assessment & Plan       Medical Decision Making  Patient is 28-year-old female presenting for concerns of marijuana abuse.  DDx: Marijuana abuse  No further workup planned at this time.  Plan allow patient to sleep off her symptoms until clinically sober.  Dispo on reassessment.  Discharge when clinically sober.                  ED Course as of 09/16/24 0146   Mon Sep 16, 2024   0145 Patient able to ambulate without any difficulty.  Plan for discharge at this time as family is at bedside and will to take her home.   0145 Clinically sober for discharge.       Medications - No data to display    History of Present Illness       HPI    Patient is 28-year-old female presenting after smoking too much weed.  Patient states she smoked a dab and a blunt.  No other complaints.  Patient states she is just \"very high\".    Review of Systems   Constitutional:         Marijuana abuse   HENT: Negative.     Eyes: Negative.    Respiratory: Negative.     Cardiovascular: Negative.    Gastrointestinal: Negative.    Endocrine: Negative.    Genitourinary: Negative.    Musculoskeletal: Negative.    Skin: Negative.    Allergic/Immunologic: Negative.    Neurological: Negative.    Hematological: Negative.    Psychiatric/Behavioral: Negative.     All other systems reviewed and are negative.          Objective     ED Triage Vitals [09/16/24 0049]   Temperature Pulse Blood Pressure Respirations SpO2 Patient Position - Orthostatic VS   98.3 °F (36.8 °C) 86 107/64 18 99 % Sitting      Temp Source Heart Rate Source BP Location FiO2 (%) Pain Score    Oral Monitor Left arm -- --        Physical Exam  Vitals and nursing note reviewed.   Constitutional:       Appearance: She is normal weight.      Comments: Patient smells of marijuana and " appears to be very high   HENT:      Head: Normocephalic and atraumatic.      Right Ear: Tympanic membrane, ear canal and external ear normal.      Left Ear: Tympanic membrane, ear canal and external ear normal.      Nose: Nose normal.      Mouth/Throat:      Mouth: Mucous membranes are moist.      Pharynx: Oropharynx is clear.   Eyes:      Extraocular Movements: Extraocular movements intact.      Conjunctiva/sclera: Conjunctivae normal.      Pupils: Pupils are equal, round, and reactive to light.   Cardiovascular:      Rate and Rhythm: Normal rate and regular rhythm.      Pulses: Normal pulses.      Heart sounds: Normal heart sounds.   Pulmonary:      Effort: Pulmonary effort is normal.      Breath sounds: Normal breath sounds.   Abdominal:      General: Abdomen is flat. Bowel sounds are normal.      Palpations: Abdomen is soft.      Tenderness: There is no abdominal tenderness. There is no guarding or rebound.   Musculoskeletal:         General: Normal range of motion.      Cervical back: Normal range of motion and neck supple.   Skin:     General: Skin is warm and dry.      Capillary Refill: Capillary refill takes less than 2 seconds.   Neurological:      General: No focal deficit present.      Mental Status: She is alert and oriented to person, place, and time.   Psychiatric:         Mood and Affect: Mood normal.         Behavior: Behavior normal.         Thought Content: Thought content normal.         Judgment: Judgment normal.         Labs Reviewed - No data to display  No orders to display       Procedures         Royce Wilson MD  09/16/24 0146